# Patient Record
Sex: FEMALE | Race: BLACK OR AFRICAN AMERICAN | NOT HISPANIC OR LATINO | Employment: STUDENT | URBAN - METROPOLITAN AREA
[De-identification: names, ages, dates, MRNs, and addresses within clinical notes are randomized per-mention and may not be internally consistent; named-entity substitution may affect disease eponyms.]

---

## 2023-02-21 LAB
ALANINE AMINOTRANSFERASE (SGPT) (U/L) IN SER/PLAS: 9 U/L (ref 7–45)
ALBUMIN (G/DL) IN SER/PLAS: 4 G/DL (ref 3.4–5)
ALKALINE PHOSPHATASE (U/L) IN SER/PLAS: 94 U/L (ref 33–110)
ANION GAP IN SER/PLAS: 7 MMOL/L (ref 10–20)
ASPARTATE AMINOTRANSFERASE (SGOT) (U/L) IN SER/PLAS: 14 U/L (ref 9–39)
BILIRUBIN TOTAL (MG/DL) IN SER/PLAS: 0.3 MG/DL (ref 0–1.2)
CALCIUM (MG/DL) IN SER/PLAS: 8.9 MG/DL (ref 8.6–10.6)
CARBON DIOXIDE, TOTAL (MMOL/L) IN SER/PLAS: 31 MMOL/L (ref 21–32)
CHLORIDE (MMOL/L) IN SER/PLAS: 104 MMOL/L (ref 98–107)
CHOLESTEROL (MG/DL) IN SER/PLAS: 139 MG/DL (ref 0–199)
CHOLESTEROL IN HDL (MG/DL) IN SER/PLAS: 48.4 MG/DL
CHOLESTEROL/HDL RATIO: 2.9
CREATININE (MG/DL) IN SER/PLAS: 0.64 MG/DL (ref 0.5–1.05)
ESTIMATED AVERAGE GLUCOSE FOR HBA1C: 100 MG/DL
GFR FEMALE: >90 ML/MIN/1.73M2
GLUCOSE (MG/DL) IN SER/PLAS: 90 MG/DL (ref 74–99)
HEMOGLOBIN A1C/HEMOGLOBIN TOTAL IN BLOOD: 5.1 %
NON-HDL CHOLESTEROL: 91 MG/DL (ref 0–119)
POTASSIUM (MMOL/L) IN SER/PLAS: 4.4 MMOL/L (ref 3.5–5.3)
PROTEIN TOTAL: 7.3 G/DL (ref 6.4–8.2)
SODIUM (MMOL/L) IN SER/PLAS: 138 MMOL/L (ref 136–145)
UREA NITROGEN (MG/DL) IN SER/PLAS: 10 MG/DL (ref 6–23)

## 2023-09-25 PROBLEM — J45.30 ASTHMA, MILD PERSISTENT (HHS-HCC): Status: ACTIVE | Noted: 2023-09-25

## 2023-09-25 PROBLEM — E55.9 VITAMIN D DEFICIENCY: Status: ACTIVE | Noted: 2023-09-25

## 2023-09-25 PROBLEM — E66.9 OBESITY: Status: ACTIVE | Noted: 2023-09-25

## 2023-09-25 PROBLEM — L83 ACANTHOSIS NIGRICANS: Status: ACTIVE | Noted: 2023-09-25

## 2023-09-25 PROBLEM — R22.1 MASS OF LEFT SIDE OF NECK: Status: ACTIVE | Noted: 2023-09-25

## 2023-09-25 PROBLEM — R29.818 SUSPECTED SLEEP APNEA: Status: ACTIVE | Noted: 2023-09-25

## 2023-09-25 PROBLEM — R79.89 ELEVATED TESTOSTERONE LEVEL IN FEMALE: Status: ACTIVE | Noted: 2023-09-25

## 2023-09-25 PROBLEM — G40.309 GENERALIZED EPILEPSY (MULTI): Status: ACTIVE | Noted: 2023-09-25

## 2023-09-25 PROBLEM — N91.1 AMENORRHEA, SECONDARY: Status: ACTIVE | Noted: 2023-09-25

## 2023-09-25 PROBLEM — N92.6 IRREGULAR MENSES: Status: ACTIVE | Noted: 2023-09-25

## 2023-09-25 RX ORDER — NORGESTREL AND ETHINYL ESTRADIOL 0.3-0.03MG
1 KIT ORAL DAILY
COMMUNITY
Start: 2020-01-21 | End: 2023-10-03 | Stop reason: SDUPTHER

## 2023-09-25 RX ORDER — MOMETASONE FUROATE 200 UG/1
1 AEROSOL RESPIRATORY (INHALATION) DAILY
COMMUNITY
End: 2023-10-03 | Stop reason: SDDI

## 2023-09-25 RX ORDER — ALBUTEROL SULFATE 90 UG/1
2 AEROSOL, METERED RESPIRATORY (INHALATION)
COMMUNITY
Start: 2019-11-15 | End: 2023-10-03 | Stop reason: SDUPTHER

## 2023-09-25 RX ORDER — CLONAZEPAM 1 MG/1
1 TABLET, ORALLY DISINTEGRATING ORAL
COMMUNITY
Start: 2017-05-19 | End: 2023-10-03 | Stop reason: ALTCHOICE

## 2023-09-25 RX ORDER — COVID-19 ANTIGEN TEST
KIT MISCELLANEOUS
COMMUNITY
Start: 2022-10-14 | End: 2024-01-11 | Stop reason: WASHOUT

## 2023-09-25 RX ORDER — FOLIC ACID 0.4 MG
1 TABLET ORAL DAILY
COMMUNITY
Start: 2017-08-25 | End: 2024-01-11 | Stop reason: SDUPTHER

## 2023-09-25 RX ORDER — FOLIC ACID 1 MG/1
1 TABLET ORAL EVERY 24 HOURS
COMMUNITY
Start: 2021-04-07 | End: 2023-10-03 | Stop reason: ALTCHOICE

## 2023-09-25 RX ORDER — LEVETIRACETAM 500 MG/1
2 TABLET, EXTENDED RELEASE ORAL DAILY
COMMUNITY
Start: 2019-06-07 | End: 2023-12-19 | Stop reason: SDUPTHER

## 2023-09-25 RX ORDER — MULTIVITAMIN
1 TABLET ORAL DAILY
COMMUNITY
Start: 2020-10-01 | End: 2023-10-03 | Stop reason: SDUPTHER

## 2023-09-25 RX ORDER — MIDAZOLAM 5 MG/.1ML
SPRAY NASAL
COMMUNITY
Start: 2022-03-24

## 2023-09-25 RX ORDER — FLUTICASONE PROPIONATE 50 MCG
1 SPRAY, SUSPENSION (ML) NASAL DAILY
COMMUNITY
Start: 2015-01-23 | End: 2024-01-11 | Stop reason: SDUPTHER

## 2023-10-03 ENCOUNTER — OFFICE VISIT (OUTPATIENT)
Dept: PEDIATRICS | Facility: CLINIC | Age: 18
End: 2023-10-03
Payer: COMMERCIAL

## 2023-10-03 ENCOUNTER — LAB (OUTPATIENT)
Dept: LAB | Facility: LAB | Age: 18
End: 2023-10-03
Payer: COMMERCIAL

## 2023-10-03 VITALS
RESPIRATION RATE: 20 BRPM | HEART RATE: 108 BPM | WEIGHT: 293 LBS | DIASTOLIC BLOOD PRESSURE: 84 MMHG | HEIGHT: 66 IN | SYSTOLIC BLOOD PRESSURE: 128 MMHG | BODY MASS INDEX: 47.09 KG/M2 | TEMPERATURE: 96.3 F

## 2023-10-03 DIAGNOSIS — J30.9 ALLERGIC RHINITIS, UNSPECIFIED SEASONALITY, UNSPECIFIED TRIGGER: ICD-10-CM

## 2023-10-03 DIAGNOSIS — Z00.00 HEALTHCARE MAINTENANCE: ICD-10-CM

## 2023-10-03 DIAGNOSIS — E66.01 CLASS 3 SEVERE OBESITY DUE TO EXCESS CALORIES WITH BODY MASS INDEX (BMI) OF 50.0 TO 59.9 IN ADULT, UNSPECIFIED WHETHER SERIOUS COMORBIDITY PRESENT (MULTI): ICD-10-CM

## 2023-10-03 DIAGNOSIS — J45.20 MILD INTERMITTENT ASTHMA WITHOUT COMPLICATION (HHS-HCC): ICD-10-CM

## 2023-10-03 DIAGNOSIS — Z23 ENCOUNTER FOR IMMUNIZATION: ICD-10-CM

## 2023-10-03 DIAGNOSIS — Z30.41 ENCOUNTER FOR SURVEILLANCE OF CONTRACEPTIVE PILLS: Primary | ICD-10-CM

## 2023-10-03 LAB
25(OH)D3 SERPL-MCNC: 6 NG/ML (ref 30–100)
ALBUMIN SERPL BCP-MCNC: 4.2 G/DL (ref 3.4–5)
ALP SERPL-CCNC: 95 U/L (ref 33–110)
ALT SERPL W P-5'-P-CCNC: 13 U/L (ref 7–45)
ANION GAP SERPL CALC-SCNC: 15 MMOL/L (ref 10–20)
AST SERPL W P-5'-P-CCNC: 18 U/L (ref 9–39)
BILIRUB SERPL-MCNC: 0.4 MG/DL (ref 0–1.2)
BUN SERPL-MCNC: 8 MG/DL (ref 6–23)
CALCIUM SERPL-MCNC: 9 MG/DL (ref 8.6–10.6)
CHLORIDE SERPL-SCNC: 102 MMOL/L (ref 98–107)
CO2 SERPL-SCNC: 25 MMOL/L (ref 21–32)
CREAT SERPL-MCNC: 0.65 MG/DL (ref 0.5–1.05)
EST. AVERAGE GLUCOSE BLD GHB EST-MCNC: 111 MG/DL
GFR SERPL CREATININE-BSD FRML MDRD: >90 ML/MIN/1.73M*2
GLUCOSE SERPL-MCNC: 89 MG/DL (ref 74–99)
HBA1C MFR BLD: 5.5 %
POTASSIUM SERPL-SCNC: 4.3 MMOL/L (ref 3.5–5.3)
PROLACTIN SERPL-MCNC: 13.5 UG/L (ref 3–20)
PROT SERPL-MCNC: 8.2 G/DL (ref 6.4–8.2)
SODIUM SERPL-SCNC: 138 MMOL/L (ref 136–145)
TSH SERPL-ACNC: 2.4 MIU/L (ref 0.44–3.98)

## 2023-10-03 PROCEDURE — 83036 HEMOGLOBIN GLYCOSYLATED A1C: CPT

## 2023-10-03 PROCEDURE — 82306 VITAMIN D 25 HYDROXY: CPT

## 2023-10-03 PROCEDURE — 84146 ASSAY OF PROLACTIN: CPT

## 2023-10-03 PROCEDURE — 3008F BODY MASS INDEX DOCD: CPT | Performed by: PEDIATRICS

## 2023-10-03 PROCEDURE — 84443 ASSAY THYROID STIM HORMONE: CPT

## 2023-10-03 PROCEDURE — 90471 IMMUNIZATION ADMIN: CPT | Performed by: PEDIATRICS

## 2023-10-03 PROCEDURE — 80053 COMPREHEN METABOLIC PANEL: CPT

## 2023-10-03 PROCEDURE — 99214 OFFICE O/P EST MOD 30 MIN: CPT | Performed by: PEDIATRICS

## 2023-10-03 PROCEDURE — 36415 COLL VENOUS BLD VENIPUNCTURE: CPT

## 2023-10-03 PROCEDURE — 99214 OFFICE O/P EST MOD 30 MIN: CPT | Mod: GC | Performed by: PEDIATRICS

## 2023-10-03 RX ORDER — ALBUTEROL SULFATE 90 UG/1
2 AEROSOL, METERED RESPIRATORY (INHALATION) EVERY 4 HOURS PRN
Qty: 18 G | Refills: 1 | Status: SHIPPED | OUTPATIENT
Start: 2023-10-03 | End: 2023-12-04

## 2023-10-03 RX ORDER — CHOLECALCIFEROL (VITAMIN D3) 25 MCG
25 TABLET ORAL ONCE
Status: SHIPPED | OUTPATIENT
Start: 2023-10-03

## 2023-10-03 RX ORDER — MULTIVITAMIN
1 TABLET ORAL DAILY
Qty: 30 TABLET | Refills: 6 | Status: SHIPPED | OUTPATIENT
Start: 2023-10-03 | End: 2023-11-14

## 2023-10-03 RX ORDER — NORGESTREL AND ETHINYL ESTRADIOL 0.3-0.03MG
1 KIT ORAL DAILY
Qty: 28 TABLET | Refills: 1 | Status: SHIPPED | OUTPATIENT
Start: 2023-10-03 | End: 2023-11-14

## 2023-10-03 ASSESSMENT — PAIN SCALES - GENERAL: PAINLEVEL: 0-NO PAIN

## 2023-10-03 NOTE — PROGRESS NOTES
HPI:  Nick is here for birth control follow up. She does not have any concerns today. She doesn't miss any doesn't miss any of her OCP doses. Her last menstrual period cycle was September 15th. Her cycle lasted 7 days. She was having heavy bleeding. On the heaviest days, she was changing her pads every 4 hours. She has a daily dairy intake. She has had no headaches, fatigue, or pain. She does not have any breakthrough bleeding.    She has never been sexually active. No sexual abuse.    Needs refills of albuterol, MVI    10 point review of systems performed and negative except as noted in the HPI.    GENERAL: Well appearing, in no acute distress.  HEENT: No conjunctival injection, no scleral icterus, no conjunctival purulence or exudate. EOMI.  NECK: Supple, full voluntary range of motion  CHEST: Normal, age appropriate external chest  RESPIRATORY: Lungs clear to auscultation bilaterally. No wheezing, no crackles, no coarse breath sounds. Normal work of breathing, age-appropriate respiratory rate.  CARDIOVASCULAR: Regular, age-appropriate rate and rhythm. No extra heart sounds, no murmurs. Cap refill <2 seconds.  ABDOMEN: Soft, non-distended. No hepatosplenomegaly, no masses palpated. No tenderness to palpation in all quadrants.  GENITOURINARY: Normal external genitalia.  MUSCULOSKELETAL: Normal muscle bulk in all extremities. Normal voluntary range of motion. No joint or limb tenderness.  SKIN: No pathological rashes seen. Well perfused.  NEURO: Sensation and motor capacities grossly intact. Alert and awake with no altered level of consciousness.  PSYCH: General affect within normal limits.    Assessment/plan:  Nick is a 18 year old with obesity presenting for an OCP follow up visit. She has no concerns at this time and would like to continue the OCP. Regarding her obesity (gained 10 kg in 10 months), we will check labs today. She has agreed to stop drinking soda and cut back on fried foods. We will reassess in  2-3 months. Plan below:    #Contraception management  - OCP refilled  - Not sexually active ever and declined pregnancy test/STD testing    #Obesity  - Checking Hgb A1c, CMP, TSH, prolactin today    #Health maintenance:  - MVI refilled  - Prescribed Vitamin D    #Mild Intermittent Asthma, well controlled  - Refilled albuterol    #Epilepsy  - Recommended restarting folic acid supplementation as prescribed    Discussed with Dr. Dodd.    Joe Saunders MD  Pediatrics, PGY-3

## 2023-10-03 NOTE — PATIENT INSTRUCTIONS
It was a pleasure seeing you in clinic today! We sent refills for your albuterol, multivitamin, and birth control. We will see you back in 3 months for a follow up visit. Please cut out sodas and fried foods. Also, please exercise at least 150 minutes per week- 2.5 hours.

## 2023-10-04 DIAGNOSIS — E55.9 VITAMIN D DEFICIENCY: Primary | ICD-10-CM

## 2023-10-04 RX ORDER — ERGOCALCIFEROL 1.25 MG/1
1.25 CAPSULE ORAL
Qty: 8 CAPSULE | Refills: 0 | Status: SHIPPED | OUTPATIENT
Start: 2023-10-04 | End: 2023-10-05 | Stop reason: SDUPTHER

## 2023-10-05 DIAGNOSIS — E55.9 VITAMIN D DEFICIENCY: ICD-10-CM

## 2023-10-05 RX ORDER — ERGOCALCIFEROL 1.25 MG/1
1.25 CAPSULE ORAL
Qty: 8 CAPSULE | Refills: 0 | Status: SHIPPED | OUTPATIENT
Start: 2023-10-05 | End: 2023-10-09 | Stop reason: SDUPTHER

## 2023-10-09 ENCOUNTER — TELEPHONE (OUTPATIENT)
Dept: PEDIATRICS | Facility: CLINIC | Age: 18
End: 2023-10-09
Payer: COMMERCIAL

## 2023-10-09 DIAGNOSIS — E55.9 VITAMIN D DEFICIENCY: ICD-10-CM

## 2023-10-09 NOTE — TELEPHONE ENCOUNTER
Copied from CRM #[id number]. Topic: Information Request - Prescription Refill FAQ  >> Oct 9, 2023  2:32 PM Phillip DE LA ROSA wrote:  Patient is requesting a call back regarding RX being sent to the incorrect pharmacy.      Storm requesting meds be sent to another pharmacy.  Changed in the chart and resent.

## 2023-10-10 RX ORDER — ERGOCALCIFEROL 1.25 MG/1
1.25 CAPSULE ORAL
Qty: 8 CAPSULE | Refills: 0 | Status: SHIPPED | OUTPATIENT
Start: 2023-10-10 | End: 2024-01-11 | Stop reason: SDUPTHER

## 2023-11-07 ENCOUNTER — TELEPHONE (OUTPATIENT)
Dept: PEDIATRIC NEUROLOGY | Facility: HOSPITAL | Age: 18
End: 2023-11-07
Payer: COMMERCIAL

## 2023-11-07 NOTE — TELEPHONE ENCOUNTER
Message left on the Pediatric Epilepsy VMX - unable to understand but the message was probably left in error, as Dr. Coleman did not see this patient before.

## 2023-12-19 DIAGNOSIS — G40.309 GENERALIZED EPILEPSY (MULTI): Primary | ICD-10-CM

## 2023-12-19 RX ORDER — LEVETIRACETAM 500 MG/1
1000 TABLET, EXTENDED RELEASE ORAL DAILY
Qty: 30 TABLET | Refills: 0 | Status: SHIPPED | OUTPATIENT
Start: 2023-12-19 | End: 2024-01-19

## 2023-12-26 RX ORDER — NORGESTREL AND ETHINYL ESTRADIOL 0.3-0.03MG
1 KIT ORAL DAILY
COMMUNITY

## 2023-12-26 RX ORDER — CLONAZEPAM 1 MG/1
1 TABLET, ORALLY DISINTEGRATING ORAL DAILY PRN
COMMUNITY
End: 2024-01-26 | Stop reason: CLARIF

## 2024-01-11 ENCOUNTER — LAB (OUTPATIENT)
Dept: LAB | Facility: LAB | Age: 19
End: 2024-01-11
Payer: COMMERCIAL

## 2024-01-11 ENCOUNTER — OFFICE VISIT (OUTPATIENT)
Dept: PEDIATRICS | Facility: CLINIC | Age: 19
End: 2024-01-11
Payer: COMMERCIAL

## 2024-01-11 VITALS
SYSTOLIC BLOOD PRESSURE: 119 MMHG | BODY MASS INDEX: 47.09 KG/M2 | HEIGHT: 66 IN | HEART RATE: 76 BPM | WEIGHT: 293 LBS | TEMPERATURE: 97.3 F | RESPIRATION RATE: 20 BRPM | DIASTOLIC BLOOD PRESSURE: 78 MMHG

## 2024-01-11 DIAGNOSIS — T78.40XD ALLERGY, SUBSEQUENT ENCOUNTER: ICD-10-CM

## 2024-01-11 DIAGNOSIS — E66.01 SEVERE OBESITY DUE TO EXCESS CALORIES WITHOUT SERIOUS COMORBIDITY WITH BODY MASS INDEX (BMI) GREATER THAN 99TH PERCENTILE FOR AGE IN PEDIATRIC PATIENT (MULTI): ICD-10-CM

## 2024-01-11 DIAGNOSIS — R56.9 SEIZURE (MULTI): ICD-10-CM

## 2024-01-11 DIAGNOSIS — E55.9 VITAMIN D DEFICIENCY: ICD-10-CM

## 2024-01-11 DIAGNOSIS — J45.20 MILD INTERMITTENT ASTHMA WITHOUT COMPLICATION (HHS-HCC): ICD-10-CM

## 2024-01-11 DIAGNOSIS — Z30.41 ENCOUNTER FOR SURVEILLANCE OF CONTRACEPTIVE PILLS: ICD-10-CM

## 2024-01-11 DIAGNOSIS — R79.89 ELEVATED TESTOSTERONE LEVEL IN FEMALE: ICD-10-CM

## 2024-01-11 DIAGNOSIS — Z00.01 ENCOUNTER FOR GENERAL ADULT MEDICAL EXAMINATION WITH ABNORMAL FINDINGS: Primary | ICD-10-CM

## 2024-01-11 DIAGNOSIS — R05.8 POST-VIRAL COUGH SYNDROME: ICD-10-CM

## 2024-01-11 PROBLEM — R22.1 MASS OF LEFT SIDE OF NECK: Status: RESOLVED | Noted: 2023-09-25 | Resolved: 2024-01-11

## 2024-01-11 PROBLEM — N92.6 IRREGULAR MENSES: Status: RESOLVED | Noted: 2023-09-25 | Resolved: 2024-01-11

## 2024-01-11 LAB
ALBUMIN SERPL BCP-MCNC: 4 G/DL (ref 3.4–5)
ALP SERPL-CCNC: 83 U/L (ref 33–110)
ALT SERPL W P-5'-P-CCNC: 14 U/L (ref 7–45)
ANION GAP SERPL CALC-SCNC: 13 MMOL/L (ref 10–20)
AST SERPL W P-5'-P-CCNC: 16 U/L (ref 9–39)
BILIRUB SERPL-MCNC: 0.3 MG/DL (ref 0–1.2)
BUN SERPL-MCNC: 4 MG/DL (ref 6–23)
CALCIUM SERPL-MCNC: 9 MG/DL (ref 8.6–10.6)
CHLORIDE SERPL-SCNC: 100 MMOL/L (ref 98–107)
CHOLEST SERPL-MCNC: 169 MG/DL (ref 0–199)
CHOLESTEROL/HDL RATIO: 3.3
CO2 SERPL-SCNC: 29 MMOL/L (ref 21–32)
CREAT SERPL-MCNC: 0.54 MG/DL (ref 0.5–1.05)
EGFRCR SERPLBLD CKD-EPI 2021: >90 ML/MIN/1.73M*2
EST. AVERAGE GLUCOSE BLD GHB EST-MCNC: 108 MG/DL
GLUCOSE SERPL-MCNC: 78 MG/DL (ref 74–99)
HBA1C MFR BLD: 5.4 %
HDLC SERPL-MCNC: 50.5 MG/DL
INSULIN SERPL-ACNC: 20 UIU/ML (ref 3–25)
LDLC SERPL CALC-MCNC: 105 MG/DL
NON HDL CHOLESTEROL: 119 MG/DL (ref 0–119)
POTASSIUM SERPL-SCNC: 4.6 MMOL/L (ref 3.5–5.3)
PROT SERPL-MCNC: 8.2 G/DL (ref 6.4–8.2)
SODIUM SERPL-SCNC: 137 MMOL/L (ref 136–145)
TRIGL SERPL-MCNC: 68 MG/DL (ref 0–149)
VLDL: 14 MG/DL (ref 0–40)

## 2024-01-11 PROCEDURE — 99395 PREV VISIT EST AGE 18-39: CPT | Mod: GC | Performed by: PEDIATRICS

## 2024-01-11 PROCEDURE — 3008F BODY MASS INDEX DOCD: CPT | Performed by: PEDIATRICS

## 2024-01-11 PROCEDURE — 36415 COLL VENOUS BLD VENIPUNCTURE: CPT

## 2024-01-11 PROCEDURE — 99214 OFFICE O/P EST MOD 30 MIN: CPT | Performed by: PEDIATRICS

## 2024-01-11 PROCEDURE — 83525 ASSAY OF INSULIN: CPT

## 2024-01-11 PROCEDURE — 99394 PREV VISIT EST AGE 12-17: CPT | Performed by: PEDIATRICS

## 2024-01-11 PROCEDURE — 83036 HEMOGLOBIN GLYCOSYLATED A1C: CPT

## 2024-01-11 PROCEDURE — 1036F TOBACCO NON-USER: CPT | Performed by: PEDIATRICS

## 2024-01-11 PROCEDURE — 80053 COMPREHEN METABOLIC PANEL: CPT

## 2024-01-11 PROCEDURE — 99395 PREV VISIT EST AGE 18-39: CPT | Performed by: PEDIATRICS

## 2024-01-11 PROCEDURE — 80061 LIPID PANEL: CPT

## 2024-01-11 RX ORDER — FOLIC ACID 0.4 MG
0.4 TABLET ORAL DAILY
Qty: 1 TABLET | Refills: 1 | Status: SHIPPED | OUTPATIENT
Start: 2024-01-11 | End: 2024-02-19 | Stop reason: SDUPTHER

## 2024-01-11 RX ORDER — MULTIVITAMIN
1 TABLET ORAL DAILY
Qty: 30 TABLET | Refills: 3 | Status: SHIPPED | OUTPATIENT
Start: 2024-01-11

## 2024-01-11 RX ORDER — NORGESTREL AND ETHINYL ESTRADIOL 0.3-0.03MG
1 KIT ORAL DAILY
Qty: 28 TABLET | Refills: 0 | Status: SHIPPED | OUTPATIENT
Start: 2024-01-11

## 2024-01-11 RX ORDER — FLUTICASONE PROPIONATE 50 MCG
1 SPRAY, SUSPENSION (ML) NASAL DAILY
Qty: 16 G | Refills: 1 | Status: SHIPPED | OUTPATIENT
Start: 2024-01-11

## 2024-01-11 RX ORDER — ALBUTEROL SULFATE 90 UG/1
2 AEROSOL, METERED RESPIRATORY (INHALATION) EVERY 4 HOURS PRN
Qty: 18 G | Refills: 1 | Status: SHIPPED | OUTPATIENT
Start: 2024-01-11 | End: 2024-03-04 | Stop reason: SDUPTHER

## 2024-01-11 RX ORDER — FERROUS SULFATE, DRIED 160(50) MG
1 TABLET, EXTENDED RELEASE ORAL DAILY
Qty: 30 TABLET | Refills: 3 | Status: SHIPPED | OUTPATIENT
Start: 2024-01-11 | End: 2024-04-09

## 2024-01-11 RX ORDER — ERGOCALCIFEROL 1.25 MG/1
1.25 CAPSULE ORAL
Qty: 8 CAPSULE | Refills: 0 | Status: SHIPPED | OUTPATIENT
Start: 2024-01-11 | End: 2024-01-11

## 2024-01-11 ASSESSMENT — PAIN SCALES - GENERAL: PAINLEVEL: 3

## 2024-01-11 NOTE — PROGRESS NOTES
Subjective   - Here today for wellness visit, presenting by herself   - URI: Onset of symptoms 01/02 with pain with swallowing and dizziness, 1 week later onset of cough. Presented to [facility] ED 01/04 at which time COVID flu and strep rapid/Cx negative, advised supportive care. Presented to urgent care 01/06 at which time reports Rx Amoxicillin, unsure what diagnosis. Overall improving, still with cough, no wheezing   - Mold in home, acute stressor, she and mother are looking into moving     HISTORY  - PMHx: Generalized epilepsy on Keppra, previously followed at [facility] and now transitioned to adult Neurology but has not had appt, last seizure 2017. Asthma. Allergies. Suspected NEEMA   - PSx: Tonsillectomy (05/19/2017).   - Hosp: None  - Med: Keppra 1000 mg daily managed by Neurology no missed doses. OCP daily, no missed doses. Albuterol PRN. Folic acid (unsure why).   - All: is allergic to banana and watermelon.  - Immunization: UTD including COVID and flu   - FamHx:family history includes Asthma in her brother, mother, mother's brother, and another family member; Birth defects in an other family member; Diabetes in her father; Hypertension in her maternal grandfather and maternal great-grandfather; Seizures in her brother and mother's brother; antiphospholipid syndrome in her mother.   - PCP: Mary Dodd MD     HEALTH MAINTENANCE/SOCIAL  - Home: Mother, half brother, and dog. Speaks with father occasionally.   - Eating: Endorses over-eating, has previously spoken with Nutrition,  has not made changes yet    - Education: Graduated high school in 2022, since then not in school and does not work. Reports spends day watching TV and playing with dog   - Employment: None  - Drugs: No/never alcohol, marijuana, or other drug use   - Sleep:  - Sexuality: Identifies as female, not sure if interested in boys or girls, never sexually active   - Suicide/Depression: [facility] weekly counseling   - Menstruation: LMP last  "month      Objective   Visit Vitals  /78   Pulse 76   Temp 36.3 °C (97.3 °F)   Resp 20   Ht 1.677 m (5' 6.02\")   Wt 146 kg (321 lb 6.9 oz)   BMI 51.84 kg/m²   OB Status Having periods   Smoking Status Never   BSA 2.61 m²      BP percentile: Growth %ile SmartLinks can only be used for patients less than 20 years old.  Height percentile: Facility age limit for growth %blas is 20 years.  Weight percentile: Facility age limit for growth %blas is 20 years.  BMI percentile: Facility age limit for growth %blas is 20 years.     Physical exam:  - Gen: Alert and well appearing. In no acute distress.   - Head/Neck: No deformities or trauma. Neck supple with normal ROM. No cervical lymphadenopathy. Acanthosis over neck   - Eyes: EOMI. PERRL. Anicteric sclera. Noninjected conjunctivae  - Mouth: MMM. No lesions or erythema  - Heart: RRR. No murmurs, rubs, or gallops appreciated. Cap refill <2 sec  - Lungs: CTAB with equal air entry. No rhonchi, rales, or wheezes. No increased WOB. Coughing frequently, dry cough   - Abdomen: Soft, non tender and nondistended with bowel sounds throughout. No hepatosplenomegaly. No masses  - MSK: No joint swelling, warmth, or redness. Moves all extremities equally. Normal muscle bulk  - Skin: No pathological rashes or lesions   - Neuro:  Awake, alert, answering questions/interacting appropriately, no gross deficits noted. Normal tone     SCREENS  -PHQ9: 9, 5-9: mild depression. [facility] Score of 2 for over eating and feeling tired. Reports acute stressor of mold in home. No/never suicidal ideation or attempt      Assessment/Plan   Nick Whiteside is a 18 y.o. female presenting for well check     1. Severe obesity with BMI >99th%ile    2. Elevated testosterone level  - Labs today (fasting): Lipid Panel, Hg A1C, CMP, insulin  - Nutrition consult   - Today provided list of obesity management meds including topiramate and phentermine and GLP 1 agonists. She will review " meds at home with mother and is willing to discuss in future.     3. Mild intermittent asthma  - Refilled albuterol 90 mcg/actuation inhaler; Inhale 2 puffs every 4 hours if needed for wheezing.      4. Post-viral cough syndrome  - No concern for bacterial infection thus Abx not warranted. Flu/COVID/strep rapid and culture negative at [facility] ED. Though no wheezing for next 2 days recommend scheduled albuterol. As tolerating PO and no bacterial focus of infection on exam, further work up not warranted, cleared for discharge home.    5. Surveillance of contraceptive pills for amenorrhea   - Refilled norgestrel-ethinyl estradioL ([place], 28,) 0.3-30 mg-mcg tablet; Take 1 tablet by mouth once daily.      6. Vitamin D deficiency  - Lab today:  Vitamin D 25-Hydroxy,Total (for eval of Vitamin D levels)  - Refilled ergocalciferol (Vitamin D2) 1.25 MG (83712 UT) capsule; Take 1 capsule (1,250 mcg) by mouth 1 (one) time per week.  Di    7. Allergy, subsequent encounter  - Refilled fluticasone (Flonase) 50 mcg/actuation nasal spray; Administer 1 spray into each nostril once daily.      8. Health maintenance  - UTD on immunizations including flu and COVID     9. Mold in home  - Given number for Althea Hernandez, offered social work consult in clinic today but patient declined     10. Concern for mild depression with PHQ score of 9  - Continue [facility] therapy, reports that symptoms are related to acute stressor of mold in home, please see above    Follow up in 1 month to discuss obesity medications, labs to be obtained today as above    Eloisa Potts MD     Staffed with attending physician Dr. Dodd

## 2024-01-11 NOTE — PATIENT INSTRUCTIONS
FOR MOLD ISSUES help, call our [facility] office: [phone]    Please go to the lab and get your labs   - Lipid Panel  - Vitamin D 25-Hydroxy,Total (for eval of Vitamin D levels)  - Hemoglobin A1C  - Comprehensive Metabolic Panel  - Insulin, Random    Topiramate and Phentermine   What are these medicines used for?    Topiramate helps patients feel less hungry and feel full more quickly. It may also help patients decrease binge eating behaviors.     Phentermine is thought to work in the brain to decrease appetite.      Both medications are used in people who carry extra weight AND who are enrolled in a weight loss program that includes dietary, physical activity, and behavior changes.       How do they work?    Topiramate was first developed to treat seizures in children and migraine headaches in adults. It affects chemical messengers in the brain, but the exact way it works to change appetite is unknown.   Phentermine is thought to work in the brain to decrease appetite.      Topiramate and phentermine may help you:    Feel less interest in eating in between meals    Think less about food and eating    Feel full or satisfied sooner    Have an easier time eating less      Topiramate extended release in combination with phentermine has been FDA approved for weight loss in patients 12 and older (marketed as Qsymia)    For some patients, these medications work right away. They feel and think quite differently about food. Other patients don't feel much of a change but find that they lose weight. Finally, some patients do not have these feelings and do not have improvements in weight. For these patients, medications may be stopped after 3 months.       Like all weight loss medications, these medications work best when you help it work. This means:    Drinking water instead of sugar sweetened drinks (e.g. regular soda, juice)   Removing tempting high calorie (“junk”) food from the house    Staying away from  situations or people that trigger your food cravings    Eating your meals at a table with all screens off (TV, phone)   Keeping track of what you are eating and drinking     How should I take these medications?    Topiramate   Week 1: One tablet (25 mg) once a day   Week 2: Two tablets (50 mg) once a day   Week 3: Three tablets (75 mg) once a day    Week 4: Four tablets (100mg) once a day. Stay at four tablets (100 mg) until you are seen again.      NEVER stop topiramate suddenly. Your medical provider will tell you how to slowly come off this medicine if needed.  NEVER take topiramate with alcohol     Phentermine   Phentermine is usually taken as a single daily dose in the morning with or without food.    Do not take a larger dose, take it more often, or take it for a longer period than your doctor tells you to. Do not drink alcohol while on phentermine.      Are these medications safe?    Topiramate   Although topiramate alone is not approved by the FDA for weight loss, it is used commonly in weight management clinics because of its effects on appetite.  It is also approved for children as young as 2 years old for other reasons such as seizures and migraines.     Most people tolerate topiramate with no problems. Please tell your medical provider if you have a history of kidney stones, if you are taking phenytoin (brand name: Dilantin) or birth control pills, or if you are pregnant. Topiramate is harmful in pregnancy. Topiramate can decrease your ability to tolerate hot weather. You should be sure to drink plenty of water to prevent dehydration and kidney stones. Your medical provider will also check for possible interactions between your usual medications and topiramate.      One of the dangers of topiramate is the possibility of birth defects. If you get pregnant when you are taking topiramate, there is the risk that your baby will be born with a cleft lip or palate. If you are on topiramate and are of child  bearing age, you must be on a reliable form of birth control or refrain from sex. Birth control pills may not work as effectively while taking topiramate.     Phentermine   Phentermine is not FDA approved for use in children or adolescents under 16 years of age by itself. You should not take phentermine if you have high blood pressure, heart disease, hyperthyroidism (overactive thyroid gland), glaucoma, if you are taking stimulant ADHD medications, if you have struggled with drug abuse, or if you are pregnant. Your medical provider will also check for possible interactions between your usual medications and phentermine.     What are the side effects?    Call your doctor right away if you notice any of the starred side effects:      Topiramate   Change in mood, especially depression or thoughts of suicide*     Severe pain in your sides, back, or groin (which may indicate a kidney stone)*   Sudden change in vision or eye pain*   New severe rash*     Phentermine   Chest pain*   Shortness of breath*    Difficulty doing exercises that you had been able to do before*    Swelling of the legs or ankles*    Severe restlessness, anxiety, or dizziness*    Increased blood pressure or heart rate       If you notice these less serious side effects, talk with your medical provider:     Topiramate   Mental fogginess, trouble concentrating, memory problems   Numbness or tingling in your hands or feet   Fatigue   New overheating   Nausea, vomiting, diarrhea or constipation     Phentermine   Trouble sleeping    Diarrhea or constipation    Dry mouth      How much does it cost?    Topiramate: $5 per month   Phentermine: $20-30/month. Currently, phentermine is not covered by insurance      If the cost is significantly more than this when you  either medication, please call us.      (Developed by: [facility], [place]) Lifestyle Medicine Program & [facility]- v.[id number])     GLP-1 Receptor Agonists (examples: liraglutide, semaglutide)     What are these medicines used for?    These medications were first developed to treat diabetes but have also been shown to have a significant effect of weight loss.      How do they work?    They work by affecting a hormone in your body that leads to decreased appetite, decreased food intake, and decreased rate that the stomach empties into the small intestine.       These medications may help you:   Feel less hungry   Lower food cravings   Increase your feeling of control around eating habits       Like all weight loss medications, these medications work best in combination with healthy nutrition, physical activity, and sleep.        How should I take these medications?    These medications are given by a small injection under the skin (“subcutaneous”) either once a day or once a week. The usual dosing is listed below, but please follow your medical provider's instructions for your specific plan.        Liraglutide (brand name: Saxenda), subcutaneous, daily    Week 1: 0.6mg every day   Week 2: 1.2mg every day   Week 3: 1.8mg every day   Week 4: 2.4mg every day   Week 5 and beyond: 3.0mg every day or maximum tolerated dose     Note: Daily dose may be split between pens. Please discuss this with your medical team or healthcare provider     Please go to the [employer] for instructions and a video regarding the technique to give yourself injections:   [email or web address].com     Semaglutude (brand name: Wegovy), subcutaneous, weekly    Week 1-4: 0.25mg once weekly   Week 5-8: 0.5mg once weekly   Week 9-12: 1mg once weekly   Week 13-16: 1.7mg once weekly   Week 17 and beyond: 2.4mg once weekly or maximum tolerated dose     Please go to the [employer] for instructions and a video regarding the technique to give yourself injections:   [email or web address].[email or web address]     Semaglutude (brand name: Ozempic), subcutaneous, weekly    Week 1-4: 0.25mg once weekly   Week  5-8: 0.5mg once weekly   Week 9-12: 1mg once weekly   Week 13-16: 2mg once weekly     Please go to the Ozempic for instructions and a video regarding the technique to give yourself injections:   [email or web address]     What if I miss a dose?   Liraglutide   If a dose is missed, restart the next day. An extra dose or increase in dose should not be taken to make up for the missed dose.    If more than 3 days have passed since the last dose, please contact your health care provider about how to restart the medication      Semaglutide:    If you miss a dose, and your next dose is more than 2 days (48 hours) away, take the missed dose when you remember   If you miss a dose, and the next scheduled dose is less than 2 days away (48 hours), do not give the dose. Take your next dose on the regularly scheduled day.   If you miss 2 or more doses, take the next dose on the regularly scheduled day or call your health care provider to talk about how to restart due to possible side effects     Storage   Liraglutide   Store new, unused Saxenda®?pens in the refrigerator between 36°F and 46°F (2°C to 8°C).    After first use, store in a refrigerator or at room temperature between 59°F and 86°F (15°C to 30°C).    Pens in use should be thrown away after 30 days even if they still have Saxenda®?left in them.    Don't freeze Saxenda®. Saxenda®?that has been frozen must not be used.       Semaglutide:    Store the WEGOVY single-dose pen in the refrigerator from 36°F to 46°F (2°C to 8°C).    If needed, prior to taking off the cap, the pen can be kept from 46°F to 86°F (8°C to 30°C) up to 28 days.    Do not freeze.      Protect WEGOVY from light. WEGOVY must be kept in the original carton until its time to inject.    Discard the WEGOVYpen after use.     Ozempic:   Store your new, unused Ozempic®?pens?in the refrigerator between 36°F to 46°F (2°C to 8°C).    Store your pen in use for 56 days at room temperature between 59ºF to 86ºF (15ºC to  30ºC) or in a refrigerator between 36°F to 46°F (2°C to 8°C).    Discard after 56 days.     Are these medications safe?    For weight loss, both liraglutide and semaglutide are FDA approved for age 12 years and older. This class of medication is also approved for people who have diabetes. As with any medication, there may be side effects. More serious things that can happen include allergic reactions, thyroid tumors, pancreatitis, gallbladder problems, kidney problems, and worsened depression.       You should not take these medications if you think you may be pregnant or are planning to become pregnant. You should not take these medications if you or a family member has medullary thyroid carcinoma or if you have multiple endocrine neoplasia type 2.       What are the possible side effects?    If you notice these common, but less serious side effects, talk with your medical provider:   Abdominal pain, nausea, vomiting, heartburn, diarrhea, constipation   Headache   Tiredness   Dizziness    Reaction at the injection site   Low blood sugar (if taking this medication with certain diabetes medications)   Increased heart rate       Call your doctor right away if you notice any of the following less common side effects:    Lump or swelling in your neck, hoarseness, trouble swallowing or shortness of breath (could be related to a new thyroid problem)   Severe abdominal pain, especially if it travels to the back (possible signs of pancreatitis)   Abdominal pain (especially in your upper stomach) with fever, yellowing of the skin or eyes or abel-colored stools (possible gallbladder problem)   If you develop rash, facial swelling, and/or trouble breathing (allergic reaction), call your medical provider or if severe, call 911     How much does it cost?    The out of pocket cost varies by insurance, but if you are asked to pay >$50 per month, please call us before filling the prescription. You can visit the following websites  to see if you qualify for a medication savings card.     Wegovy: https://www.[employer]/wegovy/savings-card.html   Saxenda: https://www.[employer]/   Ozempic: https://www.[employer]/ozempic/savings-card.html?rut=020301421       (Developed by: [facility] Lifestyle Medicine Program)

## 2024-01-18 DIAGNOSIS — G40.309 GENERALIZED EPILEPSY (MULTI): ICD-10-CM

## 2024-01-19 RX ORDER — LEVETIRACETAM 500 MG/1
1000 TABLET, EXTENDED RELEASE ORAL DAILY
Qty: 30 TABLET | Refills: 0 | Status: SHIPPED | OUTPATIENT
Start: 2024-01-19 | End: 2024-01-26 | Stop reason: SDUPTHER

## 2024-01-26 ENCOUNTER — OFFICE VISIT (OUTPATIENT)
Dept: PEDIATRIC NEUROLOGY | Facility: CLINIC | Age: 19
End: 2024-01-26
Payer: COMMERCIAL

## 2024-01-26 VITALS
HEIGHT: 66 IN | WEIGHT: 293 LBS | DIASTOLIC BLOOD PRESSURE: 80 MMHG | RESPIRATION RATE: 20 BRPM | BODY MASS INDEX: 47.09 KG/M2 | SYSTOLIC BLOOD PRESSURE: 121 MMHG | TEMPERATURE: 98 F | HEART RATE: 92 BPM

## 2024-01-26 DIAGNOSIS — G40.309 GENERALIZED EPILEPSY (MULTI): Primary | ICD-10-CM

## 2024-01-26 PROCEDURE — 3008F BODY MASS INDEX DOCD: CPT | Performed by: PSYCHIATRY & NEUROLOGY

## 2024-01-26 PROCEDURE — 99214 OFFICE O/P EST MOD 30 MIN: CPT | Performed by: PSYCHIATRY & NEUROLOGY

## 2024-01-26 PROCEDURE — 1036F TOBACCO NON-USER: CPT | Performed by: PSYCHIATRY & NEUROLOGY

## 2024-01-26 RX ORDER — LEVETIRACETAM 500 MG/1
1000 TABLET, EXTENDED RELEASE ORAL DAILY
Qty: 60 TABLET | Refills: 8 | Status: SHIPPED | OUTPATIENT
Start: 2024-01-26 | End: 2024-02-25

## 2024-01-26 RX ORDER — DIAZEPAM 10 MG/100UL
1 SPRAY NASAL ONCE
Qty: 1 SPRAY | Refills: 1 | Status: SHIPPED | OUTPATIENT
Start: 2024-01-26 | End: 2024-01-26

## 2024-01-26 NOTE — PROGRESS NOTES
Patient Discussion/Summary     Continue Extended release 1000 mg a day     Discussed Nayzilam administration for seizure longer than 5 minutes to replace clonazepam disintegrating tablet     Continue folic acid 4 mcg qd     Follow-up in April 2023     Please call my office if you have a seizure without skipping Keppra     Discussed seizure precaution      Provider Impressions  Dr. 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 Past EEG showed generalized spike and wave discharges     Seizure Semiology: 1) Left Version --> generalized tonic clonic seizures      Last seizure in May 2017-when she weaned off the Keppra     Admits complete adherence to Keppra.     NO seizure. DOing well on LEV ER 1000 mg QHS. Want to see if we can wean KEppra next year-per patient. Follow up in September-VIRTUALLY*     She is not driving now, graduated from school.     ------------------------------------------------------------------------------------------------------------------------------------------  CONTROLLED SUBSTANCE-DOCUMENTATION    I have personally reviewed the OARRS report. This report is scanned into the electronic medical record. I have considered the risks of abuse, dependence, addiction and diversion. I believe that it is clinically appropriate  to be prescribed this medication.  Also, I believe that it is clinically appropriate for this patient to be prescribed this medication. Based on the patient's condition and response to current treatment regimen, I do not feel that it is clinically necessary for this patient to be seen in the office every 90 days.     (diazepam, clonazepam, IN midazolam)  What is the patient's goal of therapy?  Seizure rescue medicine  Is this being achieved with current treatment?  Yes    (clobazam, lacosamide, perampanel, Epidiolex, briviacetam)  What is the patient's goal of therapy?  Seizure treatment  Is this  "being achieved with current treatment?  Yes    UDS is not clinically indicated.  Assessed for risk of addiction, abuse and/or diversion using \"red flags.\"  Patient was counseled on the abuse potential. Patient was educated on the risk and effect of combining multiple controlled substances. Patient voiced understanding of the risks of combination of opioids and benzodiazepines,  and I discussed alternative treatment options when applicable.    Patient was reminded that it is both unsafe and unlawful to give away or sell controlled substance.  Discussed proper and secure storage and disposal of unused medications.        Chief Complaint     follow up sz   Accompanied by self.      History of Present Illness     The patient is Dr. Darcie Clemente's patient. Patient is being transferred to my care. Below note is from last neurology clinic note.     Nick is 15 year old right-handed girl with epilepsy, now on Keppra without recurrence thus far and tolerating the medication well with ongoing seizure freedom. We discussed updating her video EEG and Keppra level. If EEG normal, we discussed the option to wean and holding off on driving until 6 months seizure free of medications. If EEG remains abnormal and she remains complaint with medications we discussed completing her driving paperwork and continuing Keppra     In the interim since her last appointment, Nick had tried to wean off her medication and then had a breakthrough seizure after discontinuing Keppra. She seemed off on the morning of the seizure, was staring into space and she dozed off and then had generalized tonic clonic seizure, foaming at the mouth, called 911. Back on her medication  since the seizure on May 2, 2021. No side effects.      Seizure History:  Seizure Semiology: 1) Left Version --> generalized tonic clonic seizures  Seizure History: 2 prior history of seizures, first occurred on [place] and consisted of shaking of bilateral upper " extremities. The second event occurred on May 19 and consisted of left head and eye deviation with snoring and gurgling sounds. She was started on Keppra following the second seizure and has not had recurrence.     Last Seizure: 2021     Routine EE/2017: Normal  2017: Normal  Video EEG:   2020: Normal     2019: video EEG for 24 hours. Seizure precautions maintained and neurological checks performed every 4 hours while awake. This video EEG is abnormal based on the presence of generalized polyspikes, seen 1-2 times per 4 hours in early stages of sleep. This finding is suggest of potential generalized epileptogenicity. No seizures recorded. No staring spell captured.      Neuroimaging: MRI 2017: Unremarkable Examination  Current AEDs: Keppra XR 1000mg Daily  Past AEDs: None  Rescue Medication: Nayzilam  ------------------------  As of 3/24/2022  She admits complete adherence to Keppra  Last seizure in May 2021 when she stopped Keppra  She does not drive now     24 hr VEEG The vEEG was normal. There were no seizures, epileptiform discharges or lateralizing signs seen. Keppra level was subtherapeutic 4.Given that she has not any seizures on a subtherapeutic dose of Keppra, the decision was made to lower her Keppra ER dose to 750mg QD.   _______________________________________________  As of 24   Now taking Keppra XR 1000 mg every day  Not driving    REVIEW OF SYSTEMS:A complete review of systems was performed and was negative for complaint with the exception of that noted above.    EXAM  Neurological exam remains unchanged since his last visit as dated above.    Optho: Not examined  CV: Normal S1-S2, regular rhythm and rate, no murmur  Lungs: Clear to auscultation bilaterally  Abdomen: Soft, NT/ND    Additional findings: overweight

## 2024-02-16 DIAGNOSIS — R56.9 SEIZURE (MULTI): ICD-10-CM

## 2024-02-16 DIAGNOSIS — J45.20 MILD INTERMITTENT ASTHMA WITHOUT COMPLICATION (HHS-HCC): ICD-10-CM

## 2024-02-19 RX ORDER — FOLIC ACID 0.4 MG
0.4 TABLET ORAL DAILY
Qty: 30 TABLET | Refills: 8 | Status: SHIPPED | OUTPATIENT
Start: 2024-02-19

## 2024-03-04 RX ORDER — ALBUTEROL SULFATE 90 UG/1
2 AEROSOL, METERED RESPIRATORY (INHALATION) EVERY 4 HOURS PRN
Qty: 18 G | Refills: 1 | Status: SHIPPED | OUTPATIENT
Start: 2024-03-04

## 2024-03-06 ENCOUNTER — OFFICE VISIT (OUTPATIENT)
Dept: PEDIATRICS | Facility: CLINIC | Age: 19
End: 2024-03-06
Payer: COMMERCIAL

## 2024-03-06 VITALS
WEIGHT: 293 LBS | BODY MASS INDEX: 54.39 KG/M2 | OXYGEN SATURATION: 98 % | TEMPERATURE: 97.6 F | SYSTOLIC BLOOD PRESSURE: 122 MMHG | HEART RATE: 99 BPM | DIASTOLIC BLOOD PRESSURE: 78 MMHG | RESPIRATION RATE: 20 BRPM

## 2024-03-06 DIAGNOSIS — R05.1 ACUTE COUGH: Primary | ICD-10-CM

## 2024-03-06 PROCEDURE — 1036F TOBACCO NON-USER: CPT | Performed by: STUDENT IN AN ORGANIZED HEALTH CARE EDUCATION/TRAINING PROGRAM

## 2024-03-06 PROCEDURE — 3008F BODY MASS INDEX DOCD: CPT | Performed by: STUDENT IN AN ORGANIZED HEALTH CARE EDUCATION/TRAINING PROGRAM

## 2024-03-06 PROCEDURE — 99213 OFFICE O/P EST LOW 20 MIN: CPT | Mod: GE | Performed by: STUDENT IN AN ORGANIZED HEALTH CARE EDUCATION/TRAINING PROGRAM

## 2024-03-06 PROCEDURE — 99213 OFFICE O/P EST LOW 20 MIN: CPT | Performed by: STUDENT IN AN ORGANIZED HEALTH CARE EDUCATION/TRAINING PROGRAM

## 2024-03-06 RX ORDER — GUAIFENESIN/DEXTROMETHORPHAN 100-10MG/5
5 SYRUP ORAL 3 TIMES DAILY PRN
Qty: 118 ML | Refills: 0 | Status: SHIPPED | OUTPATIENT
Start: 2024-03-06 | End: 2024-03-16

## 2024-03-06 NOTE — LETTER
March 6, 2024     Patient: Nick Whiteside   YOB: 2005   Date of Visit: 3/6/2024       To Whom It May Concern:    Nick Whiteside was seen in my clinic on 3/6/2024 at 1:45 pm. Please excuse Nick for her absence from work on this day to make the appointment.    If you have any questions or concerns, please don't hesitate to call.         Sincerely,         Daphney Euceda MD

## 2024-03-06 NOTE — PROGRESS NOTES
HPI: Nick Whiteside is a 18 y.o. female with PMH Seizures, asthma, mild depression, obesity, presenting to [facility] acute care with acute cough. Patient states was in the ED in January for a cough and was swabbed for covid and flu and it was negative. She was told she had a viral infection and to use motrin as needed. She then was seen in peds clinic in feb and still had the cough at that time. She was prescribed flonase and encouraged to use her inhalers. She states her cough went away on feb 16 and then returned on feb 28. She denies any systemic symptoms like fevers and chills. Denies any uri like symptoms. She endorses waking up every night coughing. She has been using her dulera daily and her albuterol intermittently. Cough is productive with a clear sputum.    ROS negative except for above mentioned.       Past Medical History: No past medical history on file.   Past Surgical History:   Past Surgical History:   Procedure Laterality Date    TONSILLECTOMY  05/19/2017    Tonsillectomy With Adenoidectomy      Medications:    Current Outpatient Medications   Medication Instructions    albuterol 90 mcg/actuation inhaler 2 puffs, inhalation, Every 4 hours PRN    calcium carbonate-vitamin D3 (Os-Ami 500 + D3) 500 mg-5 mcg (200 unit) tablet 1 tablet, oral, Daily    diazePAM (Valtoco) 20 mg/2 spray spray,non-aerosol nasal spray 1 spray, Each Nostril, Once, For Seizure longer than 3 min.    fluticasone (Flonase) 50 mcg/actuation nasal spray 1 spray, Each Nostril, Daily    folic acid (FOLVITE) 0.4 mg, oral, Daily    levETIRAcetam XR (KEPPRA XR) 1,000 mg, oral, Daily    mometasone-formoterol (Dulera 100) 100-5 mcg/actuation inhaler 2 puffs, inhalation, 2 times daily RT, Rinse mouth with water after use to reduce aftertaste and incidence of candidiasis. Do not swallow.    multivitamin tablet 1 tablet, oral, Daily    nasal spray midazolam (Nayzilam) 5 mg/spray (0.1 mL) spray,non-aerosol nasal    norgestrel-ethinyl  estradioL (Cryselle, 28,) 0.3-30 mg-mcg tablet 1 tablet, oral, Daily    norgestrel-ethinyl estradioL (Low-Ogestrel, 28,) 0.3-30 mg-mcg tablet 1 tablet, oral, Daily      Allergies:   Allergies   Allergen Reactions    Banana Swelling    Watermelon Swelling      Immunizations:   Immunization History   Administered Date(s) Administered    DTaP vaccine, pediatric  (INFANRIX) 2005, 2005, 02/01/2005, 08/19/2008    DTaP, Unspecified 2005    Flu vaccine (IIV4), preservative free *Check age/dose* 01/05/2017, 12/06/2021, 12/13/2022, 10/03/2023    HPV 9-valent vaccine (GARDASIL 9) 03/15/2016    HPV, Quadrivalent 11/14/2013, 01/23/2015    Hepatitis A vaccine, pediatric/adolescent (HAVRIX, VAQTA) 06/11/2010, 06/13/2011    Hepatitis B vaccine, pediatric/adolescent (RECOMBIVAX, ENGERIX) 2005, 2005, 2003    HiB PRP-OMP conjugate vaccine, pediatric (PEDVAXHIB) 2005, 2003, 01/27/2004    HiB PRP-T conjugate vaccine (HIBERIX, ACTHIB) 02/01/2005    Influenza Whole 2003, 01/27/2004    Influenza, Unspecified 02/01/2005, 10/03/2018, 10/08/2019, 11/17/2020    Influenza, live, intranasal 11/02/2009, 10/18/2010, 11/10/2011, 10/08/2012, 01/23/2015, 03/15/2016    Influenza, live, intranasal, quadrivalent 11/14/2013    Influenza, seasonal, injectable, preservative free 11/10/2005    MMR vaccine, subcutaneous (MMR II) 06/29/2004, 08/19/2008    Meningococcal ACWY vaccine (MENVEO) 01/23/2015    Meningococcal B, Unspecified 10/08/2019, 11/17/2020, 08/05/2021    Meningococcal MCV4P 10/08/2019    Moderna SARS-CoV-2 Vaccination 07/18/2021, 08/15/2021    Pfizer Purple Cap SARS-CoV-2 01/27/2022    Pneumococcal Conjugate PCV 7 2005, 2003, 2003, 02/01/2005    Poliovirus vaccine, subcutaneous (IPOL) 2003, 2003, 2003, 08/19/2008    Tdap vaccine, age 7 year and older (BOOSTRIX, ADACEL) 01/23/2015    Varicella vaccine, subcutaneous (VARIVAX) 06/29/2004, 08/19/2008      Family History: denies family history pertinent to presenting problem  Family History   Problem Relation Name Age of Onset    Other (antiphospholipid syndrome) Mother      Asthma Mother      Diabetes Father      Asthma Brother      Seizures Brother      Seizures Mother's Brother      Asthma Mother's Brother      Hypertension Maternal Grandfather      Hypertension Maternal Great-Grandfather      Asthma Other Family hx     Birth defects Other Family hx       /School: school      Visit Vitals  /78   Pulse 99   Temp 36.4 °C (97.6 °F) (Temporal)   Resp 20   Wt (!) 154 kg (338 lb 6.5 oz)   SpO2 98%   BMI 54.39 kg/m²   OB Status Having periods   Smoking Status Never   BSA 2.68 m²       Physical Exam  General: Well developed, well nourished, alert and cooperative, appears to be in no acute distress.   HEENT: Normocephalic, atraumatic. No nasal discharge. Posterior oropharynx without erythema or exudates.   Cardiac: Normal S1 and S2.   Lungs: Clear to auscultation bilaterally. No rales, rhonchi, wheezing, diminished breath sounds.   MSK: ROM intact spine and extremities.  Neuro: CN II-XII grossly intact.  Psych: Demonstrates good judgement and reason.     Assessment and Plan:   Nick Whiteside is a 18 y.o. female with PMH Seizures, asthma, mild depression, obesity, presenting to [facility] acute care with concern for cough. On arrival Nick Whiteside was afebrile, HDS, well appearing, and in no acute distress. Exam largely unremarkable, however patient was coughing intermittently during Hpi. Most likely etiology of presentation is viral cough given absence of other systemic signs and acute onset vs exacerbation of her asthma. Plan for symptomatic treatment only at this time with motrin prn, cough drops and robitussin Dm. Discussed with patient importance of follow up in clinic within the next month if symptoms do not improve, she would benefit from pfts and possible titration of her asthma medications. Patient  in agreement with plan.      Discussed the expected time course of symptoms and gave return precautions. Advised follow-up if symptoms worsen. Parents/Guardian agreeable with plan.     Pt discussed with Dr. Bhavesh Euceda MD  Family Medicine, PGY-3

## 2024-06-20 DIAGNOSIS — Z30.41 ENCOUNTER FOR SURVEILLANCE OF CONTRACEPTIVE PILLS: ICD-10-CM

## 2024-06-20 DIAGNOSIS — J45.20 MILD INTERMITTENT ASTHMA WITHOUT COMPLICATION (HHS-HCC): ICD-10-CM

## 2024-06-20 RX ORDER — ALBUTEROL SULFATE 90 UG/1
2 AEROSOL, METERED RESPIRATORY (INHALATION) EVERY 4 HOURS PRN
Qty: 18 G | Refills: 1 | Status: SHIPPED | OUTPATIENT
Start: 2024-06-20

## 2024-06-20 RX ORDER — NORGESTREL AND ETHINYL ESTRADIOL 0.3-0.03MG
1 KIT ORAL DAILY
Qty: 28 TABLET | Refills: 0 | Status: SHIPPED | OUTPATIENT
Start: 2024-06-20

## 2024-07-02 ENCOUNTER — APPOINTMENT (OUTPATIENT)
Dept: PEDIATRICS | Facility: CLINIC | Age: 19
End: 2024-07-02
Payer: COMMERCIAL

## 2024-07-30 ENCOUNTER — OFFICE VISIT (OUTPATIENT)
Dept: PEDIATRICS | Facility: CLINIC | Age: 19
End: 2024-07-30
Payer: COMMERCIAL

## 2024-07-30 ENCOUNTER — LAB (OUTPATIENT)
Dept: LAB | Facility: LAB | Age: 19
End: 2024-07-30
Payer: COMMERCIAL

## 2024-07-30 VITALS
WEIGHT: 293 LBS | TEMPERATURE: 97.9 F | HEART RATE: 82 BPM | OXYGEN SATURATION: 98 % | SYSTOLIC BLOOD PRESSURE: 122 MMHG | RESPIRATION RATE: 17 BRPM | DIASTOLIC BLOOD PRESSURE: 78 MMHG | HEIGHT: 66 IN | BODY MASS INDEX: 47.09 KG/M2

## 2024-07-30 DIAGNOSIS — E66.01 CLASS 3 OBESITY (MULTI): ICD-10-CM

## 2024-07-30 DIAGNOSIS — M25.473 ANKLE SWELLING, UNSPECIFIED LATERALITY: Primary | ICD-10-CM

## 2024-07-30 DIAGNOSIS — Z13.1 ENCOUNTER FOR SCREENING EXAMINATION FOR IMPAIRED GLUCOSE REGULATION AND DIABETES MELLITUS: ICD-10-CM

## 2024-07-30 DIAGNOSIS — M25.473 ANKLE SWELLING, UNSPECIFIED LATERALITY: ICD-10-CM

## 2024-07-30 DIAGNOSIS — E66.01 SEVERE OBESITY DUE TO EXCESS CALORIES WITHOUT SERIOUS COMORBIDITY WITH BODY MASS INDEX (BMI) GREATER THAN 99TH PERCENTILE FOR AGE IN PEDIATRIC PATIENT (MULTI): ICD-10-CM

## 2024-07-30 DIAGNOSIS — Z13.29 SCREENING FOR THYROID DISORDER: ICD-10-CM

## 2024-07-30 DIAGNOSIS — Z30.41 ENCOUNTER FOR SURVEILLANCE OF CONTRACEPTIVE PILLS: ICD-10-CM

## 2024-07-30 DIAGNOSIS — Z13.220 NEED FOR LIPID SCREENING: ICD-10-CM

## 2024-07-30 LAB
25(OH)D3 SERPL-MCNC: 7 NG/ML (ref 30–100)
ALBUMIN SERPL BCP-MCNC: 3.9 G/DL (ref 3.4–5)
ALP SERPL-CCNC: 81 U/L (ref 33–110)
ALT SERPL W P-5'-P-CCNC: 9 U/L (ref 7–45)
ANION GAP SERPL CALC-SCNC: 11 MMOL/L (ref 10–20)
AST SERPL W P-5'-P-CCNC: 15 U/L (ref 9–39)
BASOPHILS # BLD AUTO: 0.04 X10*3/UL (ref 0–0.1)
BASOPHILS NFR BLD AUTO: 0.5 %
BILIRUB SERPL-MCNC: 0.4 MG/DL (ref 0–1.2)
BNP SERPL-MCNC: 10 PG/ML (ref 0–99)
BUN SERPL-MCNC: 10 MG/DL (ref 6–23)
CALCIUM SERPL-MCNC: 8.6 MG/DL (ref 8.6–10.6)
CHLORIDE SERPL-SCNC: 102 MMOL/L (ref 98–107)
CHOLEST SERPL-MCNC: 147 MG/DL (ref 0–199)
CHOLESTEROL/HDL RATIO: 3.2
CO2 SERPL-SCNC: 28 MMOL/L (ref 21–32)
CREAT SERPL-MCNC: 0.56 MG/DL (ref 0.5–1.05)
EGFRCR SERPLBLD CKD-EPI 2021: >90 ML/MIN/1.73M*2
EOSINOPHIL # BLD AUTO: 0.15 X10*3/UL (ref 0–0.7)
EOSINOPHIL NFR BLD AUTO: 1.9 %
ERYTHROCYTE [DISTWIDTH] IN BLOOD BY AUTOMATED COUNT: 14.2 % (ref 11.5–14.5)
EST. AVERAGE GLUCOSE BLD GHB EST-MCNC: 105 MG/DL
GLUCOSE SERPL-MCNC: 96 MG/DL (ref 74–99)
HBA1C MFR BLD: 5.3 %
HCT VFR BLD AUTO: 39.8 % (ref 36–46)
HDLC SERPL-MCNC: 45.5 MG/DL
HGB BLD-MCNC: 12.2 G/DL (ref 12–16)
IMM GRANULOCYTES # BLD AUTO: 0.02 X10*3/UL (ref 0–0.7)
IMM GRANULOCYTES NFR BLD AUTO: 0.3 % (ref 0–0.9)
LDLC SERPL CALC-MCNC: 92 MG/DL
LYMPHOCYTES # BLD AUTO: 1.61 X10*3/UL (ref 1.2–4.8)
LYMPHOCYTES NFR BLD AUTO: 20.6 %
MCH RBC QN AUTO: 24.2 PG (ref 26–34)
MCHC RBC AUTO-ENTMCNC: 30.7 G/DL (ref 32–36)
MCV RBC AUTO: 79 FL (ref 80–100)
MONOCYTES # BLD AUTO: 0.73 X10*3/UL (ref 0.1–1)
MONOCYTES NFR BLD AUTO: 9.3 %
NEUTROPHILS # BLD AUTO: 5.26 X10*3/UL (ref 1.2–7.7)
NEUTROPHILS NFR BLD AUTO: 67.4 %
NON HDL CHOLESTEROL: 102 MG/DL (ref 0–149)
NRBC BLD-RTO: 0 /100 WBCS (ref 0–0)
PLATELET # BLD AUTO: 371 X10*3/UL (ref 150–450)
POC APPEARANCE, URINE: ABNORMAL
POC BILIRUBIN, URINE: ABNORMAL
POC BLOOD, URINE: ABNORMAL
POC COLOR, URINE: YELLOW
POC GLUCOSE, URINE: NEGATIVE MG/DL
POC KETONES, URINE: NEGATIVE MG/DL
POC LEUKOCYTES, URINE: NEGATIVE
POC NITRITE,URINE: NEGATIVE
POC PH, URINE: 7 PH
POC PROTEIN, URINE: ABNORMAL MG/DL
POC SPECIFIC GRAVITY, URINE: 1.02
POC UROBILINOGEN, URINE: 1 EU/DL
POTASSIUM SERPL-SCNC: 4.1 MMOL/L (ref 3.5–5.3)
PROT SERPL-MCNC: 7.6 G/DL (ref 6.4–8.2)
RBC # BLD AUTO: 5.04 X10*6/UL (ref 4–5.2)
SODIUM SERPL-SCNC: 137 MMOL/L (ref 136–145)
TRIGL SERPL-MCNC: 50 MG/DL (ref 0–149)
TSH SERPL-ACNC: 3.12 MIU/L (ref 0.44–3.98)
VLDL: 10 MG/DL (ref 0–40)
WBC # BLD AUTO: 7.8 X10*3/UL (ref 4.4–11.3)

## 2024-07-30 PROCEDURE — 85025 COMPLETE CBC W/AUTO DIFF WBC: CPT

## 2024-07-30 PROCEDURE — 80061 LIPID PANEL: CPT

## 2024-07-30 PROCEDURE — 83880 ASSAY OF NATRIURETIC PEPTIDE: CPT

## 2024-07-30 PROCEDURE — 84443 ASSAY THYROID STIM HORMONE: CPT

## 2024-07-30 PROCEDURE — 81002 URINALYSIS NONAUTO W/O SCOPE: CPT | Performed by: PEDIATRICS

## 2024-07-30 PROCEDURE — 3008F BODY MASS INDEX DOCD: CPT | Performed by: PEDIATRICS

## 2024-07-30 PROCEDURE — 80053 COMPREHEN METABOLIC PANEL: CPT

## 2024-07-30 PROCEDURE — 99214 OFFICE O/P EST MOD 30 MIN: CPT | Performed by: PEDIATRICS

## 2024-07-30 PROCEDURE — 36415 COLL VENOUS BLD VENIPUNCTURE: CPT

## 2024-07-30 PROCEDURE — 99214 OFFICE O/P EST MOD 30 MIN: CPT | Mod: GC | Performed by: PEDIATRICS

## 2024-07-30 PROCEDURE — 82306 VITAMIN D 25 HYDROXY: CPT

## 2024-07-30 PROCEDURE — 83036 HEMOGLOBIN GLYCOSYLATED A1C: CPT

## 2024-07-30 RX ORDER — NORGESTREL AND ETHINYL ESTRADIOL 0.3-0.03MG
1 KIT ORAL DAILY
Qty: 28 TABLET | Refills: 12 | Status: CANCELLED | OUTPATIENT
Start: 2024-07-30

## 2024-08-01 DIAGNOSIS — R79.89 LOW SERUM VITAMIN D: Primary | ICD-10-CM

## 2024-08-01 RX ORDER — ERGOCALCIFEROL 1.25 MG/1
50000 CAPSULE ORAL
Qty: 8 CAPSULE | Refills: 0 | Status: SHIPPED | OUTPATIENT
Start: 2024-08-04 | End: 2024-09-23

## 2024-08-29 DIAGNOSIS — G40.309 GENERALIZED EPILEPSY (MULTI): ICD-10-CM

## 2024-08-29 RX ORDER — LEVETIRACETAM 500 MG/1
1000 TABLET, EXTENDED RELEASE ORAL DAILY
Qty: 60 TABLET | Refills: 0 | Status: SHIPPED | OUTPATIENT
Start: 2024-08-29 | End: 2024-09-28

## 2024-09-24 DIAGNOSIS — J45.20 MILD INTERMITTENT ASTHMA WITHOUT COMPLICATION (HHS-HCC): ICD-10-CM

## 2024-09-24 DIAGNOSIS — R79.89 LOW SERUM VITAMIN D: ICD-10-CM

## 2024-09-24 RX ORDER — ERGOCALCIFEROL 1.25 MG/1
50000 CAPSULE ORAL
Qty: 4 CAPSULE | Refills: 0 | OUTPATIENT
Start: 2024-09-29 | End: 2024-11-18

## 2024-09-24 RX ORDER — MOMETASONE FUROATE AND FORMOTEROL FUMARATE DIHYDRATE 100; 5 UG/1; UG/1
2 AEROSOL RESPIRATORY (INHALATION) 2 TIMES DAILY
Qty: 13 G | Refills: 11 | OUTPATIENT
Start: 2024-09-24

## 2024-09-27 ENCOUNTER — APPOINTMENT (OUTPATIENT)
Dept: PEDIATRIC NEUROLOGY | Facility: CLINIC | Age: 19
End: 2024-09-27
Payer: COMMERCIAL

## 2024-09-27 DIAGNOSIS — R56.9 SEIZURE (MULTI): ICD-10-CM

## 2024-09-27 DIAGNOSIS — G40.309 GENERALIZED EPILEPSY (MULTI): ICD-10-CM

## 2024-09-27 PROCEDURE — 99214 OFFICE O/P EST MOD 30 MIN: CPT | Performed by: PSYCHIATRY & NEUROLOGY

## 2024-09-27 RX ORDER — FOLIC ACID 0.4 MG
0.4 TABLET ORAL DAILY
Qty: 30 TABLET | Refills: 8 | Status: SHIPPED | OUTPATIENT
Start: 2024-09-27

## 2024-09-27 RX ORDER — LEVETIRACETAM 500 MG/1
1000 TABLET, EXTENDED RELEASE ORAL DAILY
Qty: 60 TABLET | Refills: 7 | Status: SHIPPED | OUTPATIENT
Start: 2024-09-27 | End: 2024-10-27

## 2024-09-27 NOTE — PROGRESS NOTES
Patient Discussion/Summary     Continue Extended release 1000 mg a day     Discussed Nayzilam administration for seizure longer than 5 minutes to replace clonazepam disintegrating tablet     Continue folic acid 4 mcg qd     Follow-up in Feb 2025- with Dr. Corrigan, Please call  adult epilepsy center scheduling line:  [phone]      Please call my office if you have a seizure without skipping Keppra     Discussed seizure precaution      Provider Impressions  Has been seeing Dr. Darcie Clemente, general child neurology.  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 Past EEG showed generalized spike and wave discharges     Seizure Semiology: 1) Left Version --> generalized tonic clonic seizures      Last seizure in May 2017-when she weaned off the Keppra     Admits complete adherence to Keppra.     NO seizure. DOing well on LEV ER 1000 mg QHS. Want to see if we can wean KEppra next year-per patient. Follow up in Jan 2025 with Dr. Corrigan-to establish care*     She is not driving now, graduated from school.     ------------------------------------------------------------------------------------------------------------------------------------------  CONTROLLED SUBSTANCE-DOCUMENTATION     I have personally reviewed the OARRS report. This report is scanned into the electronic medical record. I have considered the risks of abuse, dependence, addiction and diversion. I believe that it is clinically appropriate  to be prescribed this medication.  Also, I believe that it is clinically appropriate for this patient to be prescribed this medication. Based on the patient's condition and response to current treatment regimen, I do not feel that it is clinically necessary for this patient to be seen in the office every 90 days.      (diazepam, clonazepam, IN midazolam)  What is the patient's goal of therapy?  Seizure rescue medicine  Is this being achieved with current  "treatment?  Yes     (clobazam, lacosamide, perampanel, Epidiolex, briviacetam)  What is the patient's goal of therapy?  Seizure treatment  Is this being achieved with current treatment?  Yes     UDS is not clinically indicated.  Assessed for risk of addiction, abuse and/or diversion using \"red flags.\"  Patient was counseled on the abuse potential. Patient was educated on the risk and effect of combining multiple controlled substances. Patient voiced understanding of the risks of combination of opioids and benzodiazepines,  and I discussed alternative treatment options when applicable.    Patient was reminded that it is both unsafe and unlawful to give away or sell controlled substance.  Discussed proper and secure storage and disposal of unused medications.    Seizure precautions:   - CANNOT take baths  - MUST have adult supervision if swimming in pool   - When taking shower there must be adult in house and door must be unlocked  -No anti-gravity activities. Feet need to be always on the ground.  -Sleep deprivation increases risk of having a seizure. So please get minium of 8 hours of sleep.  - If you have a driving license, do not drive for 6 mo for your last seizure.  Seizure Management:   - If he has seizure place on his side   - Do NOT place anything in his mouth   - If seizure lasts > 5 minutes, use rescue medication    Understanding Your Child’s Risk for SUDEP and the need to have as few seizures as possible  WHAT IS SUDEP?   SUDEP is Sudden Unexpected Death in Epilepsy. It is the most common epilepsy-related cause of death. SUDEP refers to the death of a mostly healthy person with epilepsy who dies unexpectedly and there is no clear reason for the death. Scientists and researchers are still learning about SUDEP and its causes. Current research is focused on problems with breathing, heartbeat and brain function after a seizure  WHAT IS MY CHILD’S RISK FOR SUDEP?   Your doctor has determined that your child is " at increased risk for SUDEP. This is because your child has had one or more seizures with stiffness and or jerking while awake or asleep in the past year.  HOW CAN WE STAY LOW RISK?   The best way to prevent SUDEP is to have as few seizures as possible, preferably none.   It is important to follow the recommendations below.   Take medication on time everyday - exactly as prescribed.  Get enough sleep.  Visit a neurologist or epileptologist (epilepsy specialist) regularly, especially if seizures continue.  Discuss additional treatments to reduce the risk of seizures - treatments include additional or alternate medications, surgery, ketogenic diet or devices.  Know your child’s seizure triggers.  Create and share a seizure response plan and seizure first aid.  If your child has seizures at night, consider using a seizure alert device or other monitor to help alert family members if a seizure happens.  WHERE CAN WE LEARN MORE?  Please visit one of our patient-advocate partners:  [email or web address]   [email or web address]   [email or web address]      Chief Complaint     follow up sz   Accompanied by self.      History of Present Illness  The patient is Dr. Darcie Clemente's patient. Patient is being transferred to my care. Below note is from last neurology clinic note.     In the past, rosa isela had tried to wean off her medication and then had a breakthrough seizure after discontinuing Keppra. She seemed off on the morning of the seizure, was staring into space and she dozed off and then had generalized tonic clonic seizure, foaming at the mouth, called 911. Back on her medication  since the seizure on May 2, 2021. No side effects.      Seizure History:  Seizure Semiology: 1) Left Version --> generalized tonic clonic seizures  Seizure History: 2 prior history of seizures, first occurred on [place] Day and consisted of shaking of bilateral upper extremities. The second event occurred on May 19 and  consisted of left head and eye deviation with snoring and gurgling sounds. She was started on Keppra following the second seizure and has not had recurrence.     Last Seizure: 2021     Routine EE/2017: Normal  2017: Normal  Video EEG:   2020: Normal  2019: video EEG for 24 hours. This video EEG is abnormal based on the presence of generalized polyspikes, seen 1-2 times per 4 hours in early stages of sleep. This finding is suggest of potential generalized epileptogenicity. No seizures recorded. No staring spell captured.      Neuroimaging: MRI 2017: Unremarkable Examination  Current AEDs: Keppra XR 1000mg Daily  Past AEDs: None  Rescue Medication: Nayzilam  ------------------------  As of 3/24/2022  She admits complete adherence to Keppra  Last seizure in May 2021 when she stopped Keppra  She does not drive now     24 hr VEEG The vEEG was normal. There were no seizures, epileptiform discharges or lateralizing signs seen. Keppra level was subtherapeutic 4.Given that she has not any seizures on a subtherapeutic dose of Keppra, the decision was made to lower her Keppra ER dose to 750mg QD.   _______________________________________________  As of 24   Now taking Keppra XR 1000 mg every day  Not driving   _______________________________________________  As of 24   Diagnosis: Generalized epilepsy  taking Keppra XR 1000 mg every day, admits to good complete adherence to medications.  Not driving  Last seizure in May 2021 when she stopped Keppra  Past AEDs: None  Rescue Medication: Nayzilam    REVIEW OF SYSTEMS:A complete review of systems was performed and was negative for complaint with the exception of that noted above.       EXAM  The following is an exam by telemedicine evaluation based on observation.     Mental status: normal   Speech: Normal comprehension, naming, repetition   Cranial Nerves:   Visual Fields: untestable   EOM: intact                 Pupils: untestable                  Face: symmetric smile                 Hearing: intact to voice                 Palate: untestable                 Shoulders: symmetric shoulder shrug                  Tongue: midline   Motor exam: No tremor                 Arms: No pronator drift                   Legs: Can hop on a foot w/o lose of balance   Sensory exam: untestable   Cerebellar:                   Not tested   Reflexes: untestable

## 2024-10-16 DIAGNOSIS — R56.9 SEIZURE (MULTI): ICD-10-CM

## 2024-10-16 DIAGNOSIS — E55.9 VITAMIN D DEFICIENCY: ICD-10-CM

## 2024-10-24 RX ORDER — MULTIVITAMIN
1 TABLET ORAL DAILY
Qty: 30 TABLET | Refills: 11 | Status: SHIPPED | OUTPATIENT
Start: 2024-10-24

## 2024-11-13 ENCOUNTER — TELEPHONE (OUTPATIENT)
Dept: PEDIATRIC NEUROLOGY | Facility: CLINIC | Age: 19
End: 2024-11-13
Payer: COMMERCIAL

## 2024-11-13 NOTE — TELEPHONE ENCOUNTER
PA submitted to [facility] via fax for Keppra XR 500mg tabs     (PA started by [facility] via [facility] )  Paper copy faxed to [place].

## 2024-11-14 NOTE — TELEPHONE ENCOUNTER
Denial rec'd.    Revised PA resubmitted to [place] via fax LEV XR 500mg due to incorrect info on previous submittal.     Await determination.

## 2024-11-15 NOTE — TELEPHONE ENCOUNTER
Denial rec'd for KEPPRA XR.    Spoke with [place], previous approval was for generic not brand. Ok'd to use levetiracetam ER  For this submission (per MELONY/RN).     AWAIT APPROVAL for Levetiracetam ER 500mg.

## 2025-01-08 ENCOUNTER — APPOINTMENT (OUTPATIENT)
Dept: OBSTETRICS AND GYNECOLOGY | Facility: CLINIC | Age: 20
End: 2025-01-08
Payer: COMMERCIAL

## 2025-02-03 ENCOUNTER — APPOINTMENT (OUTPATIENT)
Dept: OBSTETRICS AND GYNECOLOGY | Facility: CLINIC | Age: 20
End: 2025-02-03
Payer: COMMERCIAL

## 2025-02-03 ENCOUNTER — APPOINTMENT (OUTPATIENT)
Dept: NEUROLOGY | Facility: HOSPITAL | Age: 20
End: 2025-02-03
Payer: COMMERCIAL

## 2025-02-18 ENCOUNTER — OFFICE VISIT (OUTPATIENT)
Dept: PEDIATRICS | Facility: CLINIC | Age: 20
End: 2025-02-18
Payer: COMMERCIAL

## 2025-02-18 ENCOUNTER — NUTRITION (OUTPATIENT)
Dept: PEDIATRICS | Facility: CLINIC | Age: 20
End: 2025-02-18

## 2025-02-18 VITALS
RESPIRATION RATE: 20 BRPM | SYSTOLIC BLOOD PRESSURE: 119 MMHG | WEIGHT: 293 LBS | HEART RATE: 78 BPM | HEIGHT: 66 IN | DIASTOLIC BLOOD PRESSURE: 78 MMHG | BODY MASS INDEX: 47.09 KG/M2 | TEMPERATURE: 97.3 F

## 2025-02-18 DIAGNOSIS — R29.818 SUSPECTED SLEEP APNEA: ICD-10-CM

## 2025-02-18 DIAGNOSIS — T78.40XD ALLERGY, SUBSEQUENT ENCOUNTER: ICD-10-CM

## 2025-02-18 DIAGNOSIS — E66.813 CLASS 3 SEVERE OBESITY DUE TO EXCESS CALORIES WITH BODY MASS INDEX (BMI) OF 50.0 TO 59.9 IN ADULT, UNSPECIFIED WHETHER SERIOUS COMORBIDITY PRESENT: ICD-10-CM

## 2025-02-18 DIAGNOSIS — E55.9 VITAMIN D DEFICIENCY: ICD-10-CM

## 2025-02-18 DIAGNOSIS — E66.01 CLASS 3 SEVERE OBESITY DUE TO EXCESS CALORIES WITH BODY MASS INDEX (BMI) OF 50.0 TO 59.9 IN ADULT, UNSPECIFIED WHETHER SERIOUS COMORBIDITY PRESENT: ICD-10-CM

## 2025-02-18 DIAGNOSIS — N91.1 AMENORRHEA, SECONDARY: Primary | ICD-10-CM

## 2025-02-18 DIAGNOSIS — J45.20 MILD INTERMITTENT ASTHMA WITHOUT COMPLICATION (HHS-HCC): ICD-10-CM

## 2025-02-18 DIAGNOSIS — R56.9 SEIZURE (MULTI): ICD-10-CM

## 2025-02-18 PROCEDURE — 90656 IIV3 VACC NO PRSV 0.5 ML IM: CPT | Performed by: PEDIATRICS

## 2025-02-18 PROCEDURE — 3008F BODY MASS INDEX DOCD: CPT | Performed by: PEDIATRICS

## 2025-02-18 PROCEDURE — 99214 OFFICE O/P EST MOD 30 MIN: CPT | Performed by: PEDIATRICS

## 2025-02-18 PROCEDURE — 99214 OFFICE O/P EST MOD 30 MIN: CPT | Mod: GC,25 | Performed by: PEDIATRICS

## 2025-02-18 RX ORDER — MOMETASONE FUROATE AND FORMOTEROL FUMARATE DIHYDRATE 100; 5 UG/1; UG/1
2 AEROSOL RESPIRATORY (INHALATION) 2 TIMES DAILY
Qty: 13 G | Refills: 11 | Status: SHIPPED | OUTPATIENT
Start: 2025-02-18

## 2025-02-18 RX ORDER — CHOLECALCIFEROL (VITAMIN D3) 25 MCG
1000 TABLET ORAL DAILY
Qty: 30 TABLET | Refills: 11 | Status: SHIPPED | OUTPATIENT
Start: 2025-02-18 | End: 2026-02-18

## 2025-02-18 RX ORDER — NORGESTIMATE AND ETHINYL ESTRADIOL 0.25-0.035
1 KIT ORAL DAILY
Qty: 28 TABLET | Refills: 11 | Status: SHIPPED | OUTPATIENT
Start: 2025-02-18 | End: 2026-02-18

## 2025-02-18 RX ORDER — FLUTICASONE PROPIONATE 50 MCG
1 SPRAY, SUSPENSION (ML) NASAL DAILY
Qty: 16 G | Refills: 3 | Status: SHIPPED | OUTPATIENT
Start: 2025-02-18

## 2025-02-18 ASSESSMENT — PAIN SCALES - GENERAL: PAINLEVEL_OUTOF10: 0-NO PAIN

## 2025-02-18 NOTE — PROGRESS NOTES
Subjective   Nick Whiteside is a 19 y.o. female presenting for follow-up.    Throws up after taking calcium + vitamin D supplement, would like to try a different supplement. Still living at home with mold present in home, trying to find a new place to live. Currently lives with mother and brother.     OCPs  Current on Turqoz for management of amenorrhea, PCOS. LMP 12/19. Previously periods had been monthly and regular. She is concerned about not having a period since December. Never been sexually active.     Weight management  Was referred to Dr. Benavidez at last appointment for weight management. She is still interested in pursuing medications for weight.    Nutrition  Eats varied diet including fruits and veggies. She works at [employer], gets lunch from there while at work. Notices that she eats more when mood is low.     24 hour food recall  Breakfast: [employer]  Lunch: double cheeseburger, fries, Sprite from [employer]  Snack: PB&J  Dinner: hamburger helper  Drinks: water    Activity   Standing and walking around at work ([employer]).     Lifestyle goals from last visit:  Nutrition Goal: Add a fruit or vegetable to lunch and dinner 5 days per week until Dr. Benavidez appt  - She incorporates fruits and veggies but not with every lunch and dinner  Movement Goal: 15 minutes of physical activity, 3 days per week (gym with brother, walking up and down stairs or around the block)  - Did not start doing physical activity. A barrier to physical activity is that she is sleepy throughout the day and does not feel like she has energy to do anything extra. She is interested in adding something more such as yoga.  - Updated movement goal: 10-15 minutes of yoga on Saturday and Sunday    Sleep  Loves to sleep. Snores. Can wake up gasping sometimes. Feels very sleepy throughout the day. Goes to bed between 8-11pm, wakes up at 6am daily.    Mood  Endorses that mood is overall good. Denies feelings of guilt, hopelessness, feeling  "down, anxiety.     Objective   Visit Vitals  /78   Pulse 78   Temp 36.3 °C (97.3 °F)   Resp 20   Ht 1.68 m (5' 6.14\")   Wt (!) 165 kg (364 lb 6.7 oz)   LMP  (LMP Unknown)   BMI 58.57 kg/m²   OB Status Having periods   Smoking Status Never   BSA 2.77 m²         Physical Exam  Constitutional:       Appearance: Normal appearance.   HENT:      Nose: Nose normal.      Mouth/Throat:      Mouth: Mucous membranes are moist.   Eyes:      Conjunctiva/sclera: Conjunctivae normal.   Cardiovascular:      Rate and Rhythm: Normal rate and regular rhythm.      Pulses: Normal pulses.      Heart sounds: Normal heart sounds.   Pulmonary:      Effort: Pulmonary effort is normal.      Breath sounds: Normal breath sounds.   Abdominal:      General: There is no distension.      Palpations: Abdomen is soft.      Tenderness: There is no abdominal tenderness.   Skin:     General: Skin is warm.   Neurological:      Mental Status: She is alert.      Gait: Gait normal.   Psychiatric:         Mood and Affect: Mood normal.         Behavior: Behavior normal.        Assessment/Plan   18yo F with severe obesity (BMI 58.57), epilepsy, PCOS, mild intermittent asthma, allergies, vitamin D deficiency presenting for follow-up of weight management, OCPs. Nick is still interested in starting a medication for assistance with weight management. She has normal HbA1c, lipids, BP. Starting phentermine today and initial appointment with Dr. Benavidez scheduled. Discussed the importance of lifestyle modifications in conjunction with medication. Lifestyle goals identified. There is concern for sleep apnea due to snoring, gasping, excessive daytime sleepiness. Referring to sleep medicine and ENT at this time for further evaluation. Periods have been regular while on Truqoz, currently not having periods even when taking the placebo week of medication. May be due to inadequate dose for weight vs thin endometrium, will transition to Sprintec.    #Elevated BMI  - " Start phentermine 8mg daily  - Dietitian Marsha ALLEN met with patient today  - Lifestyle goals:   Nutrition Goal: Add a fruit or vegetable to lunch and dinner 5 days per week until Dr. Benavidez appt  Movement Goal: 15 minutes of yoga on Saturday and Sunday  - Initial appt with Dr. Benavidez scheduled for 4/17  - Appt with Marsha Trivedi RD 4/17    #Suspected NEEMA  - Referral to sleep medicine, ENT    #Amenorrhea, PCOS  - Stop Truqoz, start Sprintec    #Vit D deficiency  - Continue vitamin D    Flu shot given today    Follow-up with Dr. Dodd in 3 months    Seen and discussed with attending, Dr. Dodd.    Luda Colindres MD  Pediatrics, PGY-2

## 2025-02-18 NOTE — PATIENT INSTRUCTIONS
Weight management  - Start phentermine daily. If you notice difficulty sleeping, racing heart, or other concerning side effects, stop the medication and call our office or message Dr. Dodd on MyChart  - Continue working on nutrition and physical activity  Nutrition Goal: Add a fruit or vegetable to lunch and dinner 5 days per week until Dr. Benavidez appt  Movement Goal: 15 minutes of yoga on Saturday and Sunday  - Your appt with Dr. Benavidez was scheduled for 4/17, she is the doctor who specializes in weight management    Sleep  You have referrals to sleep medicine and ENT to evaluate you for sleep apnea (pausing in breathing while sleeping). Call [phone] to schedule these appointments  - Start using Flonase daily if you are not already    Periods  Your birth control was switched to one called [facility]. Follow-up with Dr. Dodd in 3 months to talk about how this change is going.

## 2025-02-26 ENCOUNTER — OFFICE VISIT (OUTPATIENT)
Dept: OBSTETRICS AND GYNECOLOGY | Facility: CLINIC | Age: 20
End: 2025-02-26
Payer: COMMERCIAL

## 2025-02-26 VITALS
SYSTOLIC BLOOD PRESSURE: 113 MMHG | DIASTOLIC BLOOD PRESSURE: 78 MMHG | HEART RATE: 80 BPM | WEIGHT: 293 LBS | BODY MASS INDEX: 59.3 KG/M2

## 2025-02-26 DIAGNOSIS — E28.2 PCOS (POLYCYSTIC OVARIAN SYNDROME): ICD-10-CM

## 2025-02-26 DIAGNOSIS — Z01.419 ENCOUNTER FOR GYNECOLOGICAL EXAMINATION WITHOUT ABNORMAL FINDING: Primary | ICD-10-CM

## 2025-02-26 PROCEDURE — 99385 PREV VISIT NEW AGE 18-39: CPT | Performed by: ADVANCED PRACTICE MIDWIFE

## 2025-02-26 PROCEDURE — 1036F TOBACCO NON-USER: CPT | Performed by: ADVANCED PRACTICE MIDWIFE

## 2025-02-26 ASSESSMENT — ENCOUNTER SYMPTOMS
ENDOCRINE NEGATIVE: 0
PSYCHIATRIC NEGATIVE: 0
GASTROINTESTINAL NEGATIVE: 0
CONSTITUTIONAL NEGATIVE: 0
CARDIOVASCULAR NEGATIVE: 0
RESPIRATORY NEGATIVE: 0
NEUROLOGICAL NEGATIVE: 0
EYES NEGATIVE: 0
HEMATOLOGIC/LYMPHATIC NEGATIVE: 0
ALLERGIC/IMMUNOLOGIC NEGATIVE: 0
MUSCULOSKELETAL NEGATIVE: 0

## 2025-02-26 ASSESSMENT — PAIN SCALES - GENERAL: PAINLEVEL_OUTOF10: 0-NO PAIN

## 2025-02-26 NOTE — PROGRESS NOTES
Subjective   Nick Whiteside is a 19 y.o. female who is here for Annual Exam (Pt here for annual exam/Lmp-Unknown /Last pap-no pap history/Std-declined/Chaperone-accepted ).     Concerns today:  First pelvic exam, feeling nervous    Periods are regular every 28-30 days, lasting 5 days on COCs.   Dysmenorrhea:none.   Cyclic symptoms include none.      Sexual Activity: never sexually active    History of prior STI: none  Desires STI screening? No    Current contraception: abstinence, on COCs for menstrual regulation in s/o PCOS    Last pap: never had pap  Family history of uterine or ovarian cancer: no  Family history of breast cancer: no  OB History    Para Term  AB Living   0 0 0 0 0 0   SAB IAB Ectopic Multiple Live Births   0 0 0 0 0     Objective   /78   Pulse 80   Wt (!) 167 kg (369 lb)   LMP  (LMP Unknown)   Breastfeeding No   BMI 59.30 kg/m²   Physical Exam  Constitutional:       General: She is not in acute distress.     Appearance: Normal appearance. She is well-developed.   Genitourinary:      Urethral meatus normal.      No lesions in the vagina.      Genitourinary Comments: Pt not able to tolerate pelvic exam, blind pap obtained      Right Labia: skin changes (scaling noted on inner thighs, perineal skin d/t chafing).      Right Labia: No rash or lesions.     Left Labia: skin changes (scaling noted on inner thighs, perineal skin d/t chafing).      Left Labia: No lesions or rash.     No vaginal discharge, erythema or bleeding.      No vaginal prolapse present.     No vaginal atrophy present.     Pelvic exam was performed with patient in the lithotomy position.   Breasts:     Right: Normal.      Left: Normal.   Neck:      Thyroid: No thyroid mass, thyromegaly or thyroid tenderness.   Cardiovascular:      Heart sounds: Normal heart sounds.   Pulmonary:      Effort: Pulmonary effort is normal.      Breath sounds: Normal breath sounds.   Abdominal:      Palpations: Abdomen is soft.  There is no mass.      Tenderness: There is no abdominal tenderness.   Lymphadenopathy:      Cervical: No cervical adenopathy.   Neurological:      General: No focal deficit present.   Skin:     General: Skin is warm and dry.   Psychiatric:         Mood and Affect: Mood and affect normal.         Behavior: Behavior is cooperative.   Vitals reviewed.     Assessment/Plan   Diagnoses and all orders for this visit:  Encounter for gynecological examination without abnormal finding  -     routine AE  -     healthy lifestyle encouraged  -     pap collected  -     condoms as needed for STI prevention  -     Gardasil UTD  -     Recommend PCP visit for routine health maintenance  -     RTO 1 year for AE or sooner PRN  PCOS (polycystic ovarian syndrome)        -     dx in 2022 by elevated testosterone, irregular menses        -     managed with KENAN by PCP, pt happy with this method        -     metabolic screen 7/2024 WNL    Follow up in about 1 year (around 2/26/2026) for Annual Exam.    Kate Friedman, APRN-CNM, APRN-CNP

## 2025-03-07 LAB
CYTOLOGY CMNT CVX/VAG CYTO-IMP: NORMAL
LAB AP CONTRACEPTIVE HISTORY: NORMAL
LAB AP HPV HR: NORMAL
LABORATORY COMMENT REPORT: NORMAL
PATH REPORT.TOTAL CANCER: NORMAL

## 2025-04-14 ENCOUNTER — APPOINTMENT (OUTPATIENT)
Dept: NEUROLOGY | Facility: HOSPITAL | Age: 20
End: 2025-04-14
Payer: COMMERCIAL

## 2025-04-17 ENCOUNTER — APPOINTMENT (OUTPATIENT)
Dept: PEDIATRICS | Facility: CLINIC | Age: 20
End: 2025-04-17
Payer: COMMERCIAL

## 2025-04-29 DIAGNOSIS — G40.309 GENERALIZED EPILEPSY (MULTI): ICD-10-CM

## 2025-04-29 RX ORDER — LEVETIRACETAM 500 MG/1
1000 TABLET, EXTENDED RELEASE ORAL DAILY
Qty: 60 TABLET | Refills: 0 | Status: SHIPPED | OUTPATIENT
Start: 2025-04-29 | End: 2025-05-29

## 2025-05-15 ENCOUNTER — OFFICE VISIT (OUTPATIENT)
Dept: PEDIATRICS | Facility: CLINIC | Age: 20
End: 2025-05-15
Payer: COMMERCIAL

## 2025-05-15 VITALS
DIASTOLIC BLOOD PRESSURE: 73 MMHG | HEIGHT: 66 IN | BODY MASS INDEX: 47.09 KG/M2 | TEMPERATURE: 97 F | RESPIRATION RATE: 22 BRPM | HEART RATE: 98 BPM | SYSTOLIC BLOOD PRESSURE: 111 MMHG | WEIGHT: 293 LBS

## 2025-05-15 DIAGNOSIS — R56.9 SEIZURE (MULTI): ICD-10-CM

## 2025-05-15 DIAGNOSIS — E55.9 VITAMIN D DEFICIENCY: ICD-10-CM

## 2025-05-15 DIAGNOSIS — L83 ACANTHOSIS NIGRICANS: ICD-10-CM

## 2025-05-15 DIAGNOSIS — N91.1 AMENORRHEA, SECONDARY: Primary | ICD-10-CM

## 2025-05-15 DIAGNOSIS — E66.813 CLASS 3 SEVERE OBESITY DUE TO EXCESS CALORIES WITH BODY MASS INDEX (BMI) OF 50.0 TO 59.9 IN ADULT, UNSPECIFIED WHETHER SERIOUS COMORBIDITY PRESENT: ICD-10-CM

## 2025-05-15 PROCEDURE — 99215 OFFICE O/P EST HI 40 MIN: CPT | Performed by: STUDENT IN AN ORGANIZED HEALTH CARE EDUCATION/TRAINING PROGRAM

## 2025-05-15 ASSESSMENT — PAIN SCALES - GENERAL: PAINLEVEL_OUTOF10: 0-NO PAIN

## 2025-05-15 NOTE — LETTER
May 15, 2025     Patient: Nick Whiteside   YOB: 2005   Date of Visit: 5/15/2025       To Whom It May Concern:    Nick Whiteside was seen in my clinic on 5/15/2025 at 10:30 am. Please excuse Nick for her absence from work on this day to make the appointment.    If you have any questions or concerns, please don't hesitate to call.         Sincerely,         Yvette Benavidez MD        CC: No Recipients

## 2025-05-15 NOTE — PROGRESS NOTES
Assessment/Plan   Nick Whiteside is a 20 y.o. female with elevated BMI (60kg/m2) c/b secondary amenorrhea (previous elevated total testosterone with normal free T 12/13/22, on COCPs) and epilepsy (on Keppra) who is referred by her PCP, Dr. Mary Dodd MD  and presents for weight management      We discussed the goal of maintaining long-term health and that lifestyle is the foundation of all weight management. We also reviewed reasons that most people have difficulty with weight management through lifestyle modification alone. We discussed advanced therapies for weight management including medications with focus on GLP/GIP RA (although due to discussed access/insurance issues, these were difficult to get currently) and Qsymia (topiramate/phentermine) combination due to cost/effectiveness.     Discussed concerns with initiation of topiramate given seizure history. Will reach out to Dr. Coleman.    Patient would like to attempt to get tirzepatide. Discussed that it is likely not covered. If PA is denied, she would like to try     Summary of Effectiveness to date:   Meds: none, phentermine previously prescribed but not filled    1. Amenorrhea, secondary (Primary)  2. Acanthosis nigricans  3. Vitamin D deficiency  4. Class 3 severe obesity due to excess calories with body mass index (BMI) of 50.0 to 59.9 in adult, unspecified whether serious comorbidity present  Labs  - Comprehensive Metabolic Panel; Future  - Hemoglobin A1C; Future  - Lipid Panel; Future  - Vitamin D 25-Hydroxy,Total (for eval of Vitamin D levels); Future    Meds: will attempt to get Zepbound covered. If not will message patient to start phentermine which was previously prescribed  - tirzepatide, weight loss, (Zepbound) 2.5 mg/0.5 mL injection; Inject 2.5 mg under the skin every 7 days.  Dispense: 4 each; Refill: 1    Goals: you're going to keep track on your phone  - Nutrition: Decrease ginger ale to one can a day 3 days a week. The rest of the days,  you can keep drinking what you are now.   - Activity: Start lifting weights with your brother, 3 days a week for 15 minutes  - Sleep: Try turning the TV off at 9pm 3 nights a week and use  a sound machine instead    Handouts  - Sleep hygiene    5. Seizure (Multi)  - Message sent to Dr. Coleman re initiation of topiramate    Future Appointments   Date Time Provider Department Center   6/19/2025  3:00 PM Yvette Benavidez MD [id number] [facility]   7/14/2025 10:00 AM Jessee Corrigan MD [id number]               Subjective   Patient ID: Nick Whiteside is a 20 y.o. female who presents for Consult       HPI    Weight Journey History:     She wants to lose weight. She has seizures. It's hard because she's on all these medicines.   She has been trying to switch her foods. She has been baking fish, eating more rice, eggs.     She never started the phentermine that Dr. Bean prescribed.    Last seizure 2015    Nutrition    B[9 or 10am]: Doesn't eat breakfast but is trying to; granola bars or oatmeal, strawberries, grapes, pineapple  L [2pm]: salad (lettuce, eggs, cheese, onion, croutons, ranch  D [8pm]: fish (baked), more salad, rice, bread  Snacks: reports that she hasn't been eating snacks regularly  Drinks: pop (gingerale, 3 mini cans), water (10 glasses)    Eats at job- works at [employer] (apples, chicken patties, chicken strips)-- eats this during shift    Denies skipping meals/restriction  Denies bingeing  Denies emotional eating    Activity  She's supposed to get another bike.   Plans to get back working out. Brother helps her lift weights    Sleep  To bed at 9pm.   Wakes up at 6am  Wakes up in the middle of the night. Last night, 2am watches TV. Stays up until she has to go for the day.Happens 2x per week  Doesn't know if she snores  No restless sleep  No AM headache  Does feel rested in the morning    Screens  Outside of work, on screens 6-7hours. Movie, TVs, Social media, reads books  TV is on when going to  "sleep    Mood  Good. Denies feeling down, depressed         Emotional eating:  Body image: \"I have confidence, but it's also like I'm ready for something different\"  Restriction:Denies  Bingeing:Denies  Compensatory behavior: Denies    Periods  Regular on COCPs. Started taking pills in January. Had regular periods 1x per month.    Motivation:         HEADS Exam  Home: Mom, brother (23)  Education/Employment: Works at [employer]. Wants to go back to [employer]-- said she had to take a test first. Wants to be a travel nurse for kids    Activities: Laughing and talking with friends    Drugs: The patient denies use of alcohol, tobacco, or illicit drugs.      Sexuality/Gender: She/her, female   The patient has never had sex of any kind  Interested in [employer]  Suicide/Depression: Denies SI or self harm.      Review of Systems    Objective   Vitals:    05/15/25 1020   BP: 111/73   Pulse: 98   Resp: 22   Temp: 36.1 °C (97 °F)       Physical Exam  Vitals reviewed.   Constitutional:       Appearance: Normal appearance.   Neck:      Comments: +Acanthosis nigricans  Cardiovascular:      Rate and Rhythm: Normal rate and regular rhythm.      Heart sounds: Normal heart sounds.   Pulmonary:      Effort: Pulmonary effort is normal.      Breath sounds: Normal breath sounds.   Abdominal:      Palpations: Abdomen is soft.      Tenderness: There is no abdominal tenderness.   Musculoskeletal:         General: No swelling. Normal range of motion.      Cervical back: Normal range of motion. No rigidity or tenderness.   Lymphadenopathy:      Cervical: No cervical adenopathy.   Skin:     General: Skin is warm and dry.   Neurological:      General: No focal deficit present.      Mental Status: She is alert.           No results found for this or any previous visit (from the past 24 hours).    I spent 65 minutes in the professional and overall care of this patient on 05/15/2025 including Preparing to see the patient, Obtaining and/or reviewing " separately obtained history, Performing a medically necessary and appropriate examination and/or evaluation , Counseling and educating the patient/family/caregiver, Ordering medications, tests or procedures, Referring and communicating with other health care professionals (when not reported separately) , Documenting clinical information in the electronic or other health record , and Independently interpreting results (not reported separately) and communicating results to the patient/family/caregiver            Yvette Benavidez MD

## 2025-05-15 NOTE — PATIENT INSTRUCTIONS
It was great to see you today, Storm!    Goals: you're going to keep track on your phone  - Nutrition: Decrease ginger ale to one can a day 3 days a week. The rest of the days, you can keep drinking what you are now.   - Activity: Start lifting weights with your brother, 3 days a week for 15 minutes  - Sleep: Try turning the TV off at 9pm 3 nights a week and use  a sound machine instead    Sleep Tips  Sleep Hygiene:  Goal: To establish good sleep habits which will allow one to initiate and maintain sleep easier  Remain active and expose oneself to bright light during day  Quiet activities prior to sleep to allow one to unwind.  This would include reading, with the light behind you and not shining directly into your face, and listening to soft music or relaxation tapes.  Regular exercise in late afternoon or early evening promotes sleep but avoid strenuous activity just prior to bed  Avoid screens and bright lights at least 1 hour prior to bedtime including phone, television, computers and video games.  Avoid caffeine  Do your sleep routine around the same time every night to get a goal of 8-10 hours of sleep    Stimulus control   Goal: Re-establish the bedroom and bed as the place where one sleeps  1) Lie down when sleeping  2) Use bedroom for sleep only  3) Leave the bedroom  if you are still awake after 15 minutes  4) Perform non-stimulating activities (reading, listening to soft music) and return to bed when drowsy    Relaxation Techniques:   1) Mindfulness:    Here is a link to a Mindfulness website.  [email or web address]   Review the intro, and begin by trying a couple of the 5 minute exercises.   Explore some of the other exercises- see if it is right for you!  2) Meditation   a) Breathe in for 10 seconds and then out for 10 seconds    b) The mind may drift which is ok.  If it shifts to thoughts which are disturbing, refocus on breathing  3) Progressive Muscle Relaxation   a)  Contract and relax  sequential groups of muscles from your toes to your head.   4) Guided Imagery   a) Create a pleasant scenario which you can imagine as you are going off to sleep.      An example for someone who likes hot air balloons:       Picture yourself walking in a green field where the basket for your hot air balloon rests.  You smell the strong fumes of the burning fuel which is heating the air as the balloon begins to fill.  The  balloon has now rising upwards and only the ropes keep the balloon from flying away.  You are now in the balloon basket. As you release the ropes, you sail off into sleep.      Adapted from recommendations by Olegario Ballard MD Director of [facility] and Diplomat of the American Board of Sleep Medicine.    Topiramate and Phentermine   I will message your neurologist about the topiramate.    What are these medicines used for?    Topiramate helps patients feel less hungry and feel full more quickly. It may also help patients decrease binge eating behaviors.     Phentermine is thought to work in the brain to decrease appetite.      Both medications are used in people who carry extra weight AND who are enrolled in a weight loss program that includes dietary, physical activity, and behavior changes.       How do they work?    Topiramate was first developed to treat seizures in children and migraine headaches in adults. It affects chemical messengers in the brain, but the exact way it works to change appetite is unknown.   Phentermine is thought to work in the brain to decrease appetite.      Topiramate and phentermine may help you:    Feel less interest in eating in between meals    Think less about food and eating    Feel full or satisfied sooner    Have an easier time eating less      Topiramate extended release in combination with phentermine has been FDA approved for weight loss in patients 12 and older (marketed as Qsymia)    For some patients, these medications work  right away. They feel and think quite differently about food. Other patients don't feel much of a change but find that they lose weight. Finally, some patients do not have these feelings and do not have improvements in weight. For these patients, medications may be stopped after 3 months.       Like all weight loss medications, these medications work best when you help it work. This means:    Drinking water instead of sugar sweetened drinks (e.g. regular soda, juice)   Removing tempting high calorie (“junk”) food from the house    Staying away from situations or people that trigger your food cravings    Eating your meals at a table with all screens off (TV, phone)   Keeping track of what you are eating and drinking     How should I take these medications?    Topiramate   Week 1: One tablet (25 mg) once a day   Week 2: Two tablets (50 mg) once a day   Week 3: Three tablets (75 mg) once a day    Week 4: Four tablets (100mg) once a day. Stay at four tablets (100 mg) until you are seen again.      NEVER stop topiramate suddenly. Your medical provider will tell you how to slowly come off this medicine if needed.  NEVER take topiramate with alcohol     Phentermine   Phentermine is usually taken as a single daily dose in the morning with or without food.    Do not take a larger dose, take it more often, or take it for a longer period than your doctor tells you to. Do not drink alcohol while on phentermine.      Are these medications safe?    Topiramate   Although topiramate alone is not approved by the FDA for weight loss, it is used commonly in weight management clinics because of its effects on appetite.  It is also approved for children as young as 2 years old for other reasons such as seizures and migraines.     Most people tolerate topiramate with no problems. Please tell your medical provider if you have a history of kidney stones, if you are taking phenytoin (brand name: Dilantin) or birth control pills, or if you  are pregnant. Topiramate is harmful in pregnancy. Topiramate can decrease your ability to tolerate hot weather. You should be sure to drink plenty of water to prevent dehydration and kidney stones. Your medical provider will also check for possible interactions between your usual medications and topiramate.      One of the dangers of topiramate is the possibility of birth defects. If you get pregnant when you are taking topiramate, there is the risk that your baby will be born with a cleft lip or palate. If you are on topiramate and are of child bearing age, you must be on a reliable form of birth control or refrain from sex. Birth control pills may not work as effectively while taking topiramate.     Phentermine   Phentermine is not FDA approved for use in children or adolescents under 16 years of age by itself. You should not take phentermine if you have high blood pressure, heart disease, hyperthyroidism (overactive thyroid gland), glaucoma, if you are taking stimulant ADHD medications, if you have struggled with drug abuse, or if you are pregnant. Your medical provider will also check for possible interactions between your usual medications and phentermine.     What are the side effects?    Call your doctor right away if you notice any of the starred side effects:      Topiramate   Change in mood, especially depression or thoughts of suicide*     Severe pain in your sides, back, or groin (which may indicate a kidney stone)*   Sudden change in vision or eye pain*   New severe rash*     Phentermine   Chest pain*   Shortness of breath*    Difficulty doing exercises that you had been able to do before*    Swelling of the legs or ankles*    Severe restlessness, anxiety, or dizziness*    Increased blood pressure or heart rate       If you notice these less serious side effects, talk with your medical provider:     Topiramate   Mental fogginess, trouble concentrating, memory problems   Numbness or tingling in your  hands or feet   Fatigue   New overheating   Nausea, vomiting, diarrhea or constipation     Phentermine   Trouble sleeping    Diarrhea or constipation    Dry mouth      How much does it cost?    Topiramate: $5 per month   Phentermine: $20-30/month. Currently, phentermine is not covered by insurance      If the cost is significantly more than this when you  either medication, please call us.      (Developed by: [facility] & The [facility] at [facility]- [email or web address])     Tirzepatide (GLP 1RA/GIP Medication)     What are these medicines used for?    This medication was first developed to treat diabetes but have also been shown to have a significant effect of weight loss.           How do they work?    They work by affecting hormones in your body that leads to decreased appetite, decreased food intake, and decreased rate that the stomach empties into the small intestine.       These medications may help you:   Reduce food intake   Feel rios with food intake   Increase your feeling of control around eating habits        Like all weight loss medications, these medications work best in combination with healthy nutrition, physical activity, and sleep efforts.      How should I take these medications?      This medication is given by a small injection under the skin once a week   Weeks 1-4: 2.5mg once weekly   Weeks 5-8: 5mg once weekly   Weeks 9-12: 7.5mg once weekly   Weeks 13-16: 10mg once weekly   Weeks 17-20: 12.5 mg once weekly   Weeks 21-24: 15mg once weekly      What if I miss a dose?   If you miss a dose, take the missed dose as soon as possible within 4 days (96 hours) after the missed dose.   If more than 4 days have passed, skip the missed dose and take your next dose on the regularly scheduled day.?   Do not?take?2?doses within?3?days of each other.     Storage   Store in the refrigerator between 36?F to  46?F (2?C to 8?C). Store in the original carton until use to protect it from light.   If needed, each single-dose pen can be stored at room temperature up to 86?F (30?C) for up to 21 days.   Do not freeze and do not use if frozen     Are these medications safe?    This class of medication is approved for adults who have diabetes. As with any medication, there may be side effects. More serious things that can happen include allergic reactions, thyroid tumors, pancreatitis, gallbladder problems, kidney problems, and worsened depression.       If you are taking other by mouth medications, discuss with your doctor because tirzepatide can change the way your other medications are absorbed by your body.      If you are taking birth control pills, this medication may decrease the effectiveness of your medication. You should use a different method of birth control or add a barrier method (ex.condoms) for 4 weeks after starting this medication and for 4 weeks after each dose increase     You should not take these medications if you think you may be pregnant or are planning to become pregnant. You should not take these medications if you or a family member has medullary thyroid carcinoma or if you have multiple endocrine neoplasia type 2.       What are the possible side effects?    If you notice these common, but less serious side effects, talk with your medical provider:     Abdominal pain, nausea, vomiting, heartburn, diarrhea, constipation   Reaction at the injection site   Low blood sugar (only if taking this medication with certain diabetes medications)   Increase heart rate   Injection site reaction     Call your doctor right away if you notice any of the following less common side effects:    Lump or swelling in your neck, hoarseness, trouble swallowing or shortness of breath (could be related to a thyroid problem)   Severe abdominal pain, especially if it travels to the back (possible signs of pancreatitis)    Abdominal pain (especially in your upper stomach) with fever, yellowing of the skin or eyes or abel-colored stools (possible gallbladder problem)   Rash, facial swelling, and/or trouble breathing (allergic reaction)     How much does it cost?    The out of pocket cost does vary by insurance, but if it is >$50 per month, please call us before filling the prescription. You can visit this website to see if you qualify for a medication savings card.     (Developed by: [facility]’s [facility])

## 2025-05-16 LAB
25(OH)D3+25(OH)D2 SERPL-MCNC: 12 NG/ML (ref 30–100)
ALBUMIN SERPL-MCNC: 3.8 G/DL (ref 3.6–5.1)
ALP SERPL-CCNC: 84 U/L (ref 31–125)
ALT SERPL-CCNC: 8 U/L (ref 6–29)
ANION GAP SERPL CALCULATED.4IONS-SCNC: 6 MMOL/L (CALC) (ref 7–17)
AST SERPL-CCNC: 12 U/L (ref 10–30)
BILIRUB SERPL-MCNC: 0.3 MG/DL (ref 0.2–1.2)
BUN SERPL-MCNC: 9 MG/DL (ref 7–25)
CALCIUM SERPL-MCNC: 8.4 MG/DL (ref 8.6–10.2)
CHLORIDE SERPL-SCNC: 104 MMOL/L (ref 98–110)
CHOLEST SERPL-MCNC: 148 MG/DL
CHOLEST/HDLC SERPL: 2.8 (CALC)
CO2 SERPL-SCNC: 28 MMOL/L (ref 20–32)
CREAT SERPL-MCNC: 0.53 MG/DL (ref 0.5–0.96)
EGFRCR SERPLBLD CKD-EPI 2021: 134 ML/MIN/1.73M2
EST. AVERAGE GLUCOSE BLD GHB EST-MCNC: 111 MG/DL
EST. AVERAGE GLUCOSE BLD GHB EST-SCNC: 6.2 MMOL/L
GLUCOSE SERPL-MCNC: 79 MG/DL (ref 65–99)
HBA1C MFR BLD: 5.5 %
HDLC SERPL-MCNC: 52 MG/DL
LDLC SERPL CALC-MCNC: 84 MG/DL (CALC)
NONHDLC SERPL-MCNC: 96 MG/DL (CALC)
POTASSIUM SERPL-SCNC: 4.2 MMOL/L (ref 3.5–5.3)
PROT SERPL-MCNC: 7.4 G/DL (ref 6.1–8.1)
SODIUM SERPL-SCNC: 138 MMOL/L (ref 135–146)
TRIGL SERPL-MCNC: 50 MG/DL

## 2025-05-20 DIAGNOSIS — E55.9 VITAMIN D DEFICIENCY: ICD-10-CM

## 2025-05-20 RX ORDER — CHOLECALCIFEROL (VITAMIN D3) 25 MCG
100 TABLET ORAL DAILY
Qty: 120 TABLET | Refills: 11 | Status: SHIPPED | OUTPATIENT
Start: 2025-05-20 | End: 2026-05-20

## 2025-05-30 DIAGNOSIS — G40.309 GENERALIZED EPILEPSY (MULTI): ICD-10-CM

## 2025-06-03 RX ORDER — LEVETIRACETAM 500 MG/1
1000 TABLET, EXTENDED RELEASE ORAL DAILY
Qty: 60 TABLET | Refills: 2 | Status: SHIPPED | OUTPATIENT
Start: 2025-06-03 | End: 2025-09-01

## 2025-06-10 DIAGNOSIS — R56.9 SEIZURE (MULTI): ICD-10-CM

## 2025-06-10 RX ORDER — FOLIC ACID 0.4 MG
0.4 TABLET ORAL DAILY
Qty: 30 TABLET | Refills: 0 | Status: SHIPPED | OUTPATIENT
Start: 2025-06-10 | End: 2025-07-10

## 2025-06-19 ENCOUNTER — APPOINTMENT (OUTPATIENT)
Dept: PEDIATRICS | Facility: CLINIC | Age: 20
End: 2025-06-19
Payer: COMMERCIAL

## 2025-07-01 DIAGNOSIS — R56.9 SEIZURE (MULTI): ICD-10-CM

## 2025-07-01 RX ORDER — FOLIC ACID 0.4 MG
0.4 TABLET ORAL DAILY
Qty: 30 TABLET | Refills: 5 | Status: SHIPPED | OUTPATIENT
Start: 2025-07-01 | End: 2025-12-28

## 2025-07-14 ENCOUNTER — OFFICE VISIT (OUTPATIENT)
Dept: NEUROLOGY | Facility: HOSPITAL | Age: 20
End: 2025-07-14
Payer: COMMERCIAL

## 2025-07-14 VITALS
DIASTOLIC BLOOD PRESSURE: 78 MMHG | BODY MASS INDEX: 47.09 KG/M2 | WEIGHT: 293 LBS | HEART RATE: 53 BPM | SYSTOLIC BLOOD PRESSURE: 128 MMHG | HEIGHT: 66 IN

## 2025-07-14 DIAGNOSIS — R56.9 SEIZURE (MULTI): ICD-10-CM

## 2025-07-14 DIAGNOSIS — G40.309 GENERALIZED EPILEPSY (MULTI): Primary | ICD-10-CM

## 2025-07-14 PROCEDURE — 99214 OFFICE O/P EST MOD 30 MIN: CPT | Mod: GC | Performed by: STUDENT IN AN ORGANIZED HEALTH CARE EDUCATION/TRAINING PROGRAM

## 2025-07-14 PROCEDURE — 99204 OFFICE O/P NEW MOD 45 MIN: CPT | Performed by: STUDENT IN AN ORGANIZED HEALTH CARE EDUCATION/TRAINING PROGRAM

## 2025-07-14 PROCEDURE — 3008F BODY MASS INDEX DOCD: CPT | Performed by: STUDENT IN AN ORGANIZED HEALTH CARE EDUCATION/TRAINING PROGRAM

## 2025-07-14 RX ORDER — LEVETIRACETAM 500 MG/1
1000 TABLET, EXTENDED RELEASE ORAL DAILY
Qty: 180 TABLET | Refills: 3 | Status: SHIPPED | OUTPATIENT
Start: 2025-07-14

## 2025-07-14 RX ORDER — DIAZEPAM 10 MG/100UL
1 SPRAY NASAL ONCE
Qty: 1 SPRAY | Refills: 1 | Status: SHIPPED | OUTPATIENT
Start: 2025-07-14 | End: 2025-07-14

## 2025-07-14 RX ORDER — FOLIC ACID 0.4 MG
0.4 TABLET ORAL DAILY
Qty: 90 TABLET | Refills: 3 | Status: SHIPPED | OUTPATIENT
Start: 2025-07-14

## 2025-07-14 ASSESSMENT — ENCOUNTER SYMPTOMS: SEIZURES: 0

## 2025-07-14 NOTE — PROGRESS NOTES
"Adult Epilepsy Clinic History and Physical    History Of Present Illness  Nick Whiteside is a 20 y.o. right-handed female presenting to the epilepsy clinic as transition of care from pediatric to adult epilepsy clinic. She was previously a patient of Dr. Clemente and Dr. Coleman.     Anamnesis (per patient): Patient reports she had her first seizure at the age of 13-14. She felt \"Something is off\", she went to her mom and next thing she remembers is seeing EMS personals trying to talk to her. She felt tired. She vomited multiple times. It took about 1-2 days for her to feel her self normal. She was told she had a GTC seizure.    Anamnesis (per witness): -NA-   Patient presented alone to this visit. Per chart review, mother reported starring episodes although patent does not recall those.     She was attempted to be taken off Keppra in 2021 but had recurrence with GTC seizure. vEEG at this time show generalized spike/polyspike and wave complexes in sleep. She was placed back on Keppra in 05/2021 and has been seizure free since. She notes sometimes she forgets to take her medications. No current side effects. She takes 400 mcg folic acid daily. Her workplace requests refill on Valtoco.    Epilepsy Risk Factors:   History of head trauma: No  History of Febrile seizures: No  Family history of seizures/epilepsy: Maternal uncle  History of CNS infection: No  History of other CNS injury: No  Birth/Developmental history: Unremarkable    Epilepsy Co-morbidites:  Depression: No  Anxiety: No  Memory loss: No  Sleep disorders: No  Migraines: No    History of status epilepticus: no    Social History:   ETOH: no  Smoking: no  Illicit drug use: Marijuana smoking  Occupation: Fast food mixing drinking, cash  Education level: no  Driving: no    Past Medical History  Medical History[1]  Surgical History  Surgical History[2]  Social History  Social History[3]  Allergies  Banana and Watermelon  Prescriptions Prior to " Admission[4]  Medications  Current Outpatient Medications   Medication Instructions    albuterol 90 mcg/actuation inhaler 2 puffs, inhalation, Every 4 hours PRN    diazePAM (Valtoco) 20 mg/2 spray spray,non-aerosol nasal spray 1 spray, Each Nostril, Once, For Seizure longer than 3 min.    Dulera 100-5 mcg/actuation inhaler 2 puffs, inhalation, 2 times daily, RINSE MOUTH WITH WATER AFTER USE TO REDUCE AFTERTASTE AND INCIDENCE OF CANDIDIASIS DO NOT SWALLOW    fluticasone (Flonase) 50 mcg/actuation nasal spray 1 spray, Each Nostril, Daily    folic acid (FOLVITE) 0.4 mg, oral, Daily    levETIRAcetam XR (KEPPRA XR) 1,000 mg, oral, Daily    mometasone-formoterol (Dulera) 100-5 mcg/actuation inhaler 2 puffs, inhalation, 2 times daily, RINSE MOUTH WITH WATER AFTER USE TO REDUCE AFTERTASTE AND INCIDENCE OF CANDIDIASIS DO NOT SWALLOW    multivitamin tablet 1 tablet, oral, Daily    nasal spray midazolam (Nayzilam) 5 mg/spray (0.1 mL) spray,non-aerosol Administer into affected nostril(s).    norgestimate-ethinyl estradiol (Ortho-Cyclen) 0.25-35 mg-mcg tablet 1 tablet, oral, Daily    phentermine 8 mg, oral, Daily    tirzepatide (weight loss) (ZEPBOUND) 2.5 mg, subcutaneous, Every 7 days    Vitamin D3 100 mcg, oral, Daily           Review of Systems   Neurological:  Negative for seizures.   All other systems reviewed and are negative.    GENERAL APPEARANCE:  No distress, alert and cooperative.   MENTAL STATE:  Orientation was normal to time, place and person. Recent and remote memory was intact.  Attention span and concentration were normal. Language testing was normal for comprehension, repetition, expression, and naming. General fund of knowledge was intact.   CRANIAL NERVES:     CN 2- Visual Acuity was grossly normal. Visual fields full to confrontation by finger counting.      CN 3, 4, 6-  Pupils round, 3 mm in diameter. No ptosis. EOMs normal alignment, full range of movement, no nystagmus     CN 5- Facial sensation intact  "bilaterally.      CN 7- Normal and symmetric facial strength. Nasolabial folds symmetric.     CN 8- Hearing intact      CN 9- Palate elevates symmetrically.      CN 11- Normal strength of shoulder shrug and neck turning      CN 12- Tongue midline, with normal bulk and strength; no fasciculations.   MOTOR:  Motor exam was normal. Muscle bulk and tone were normal in both upper and lower extremities. Muscle strength was 5/5 in distal and proximal muscles in both upper and lower extremities. No fasciculations, tremor or other abnormal movements were present.   REFLEXES:  Right/Left:  Biceps 2/2, brachioradialis 2/2, patellar 2/2, achilles 2/2   SENSORY: Sensory exam was intact to light touch in both UE and LE.   COORDINATION: Finger tapping and open-close hand maneuvers were smooth and without interruptions.  Finger-nose-finger was intact without dysmetria or overshoot.    GAIT: Station was stable with a normal base and negative Romberg sign. Gait was stable with a normal arm swing and speed. No ataxia, shuffling, steppage or waddling was noted. Tandem gait was intact  Last Recorded Vitals  Blood pressure 128/78, pulse 53, height 1.676 m (5' 6\"), weight (!) 168 kg (370 lb), not currently breastfeeding.    Relevant Results/Neurodiagnostics  No MRI head results found for the past 12 months    No EEG results found for the past 12 months     Routine EE/2016: Normal  2017: Normal  Video EEG:   2020: Normal  2019: video EEG for 24 hours. This video EEG is abnormal based on the presence of generalized polyspikes, seen 1-2 times per 4 hours in early stages of sleep. This finding is suggest of potential generalized epileptogenicity. No seizures recorded. No staring spell captured.   2021: The vEEG was normal. There were no seizures, epileptiform discharges or lateralizing signs seen.       Neuroimaging: MRI 2017: Unremarkable Examination     I have personally reviewed the above imaging and EEG " results.     Assessment/Plan  4D-Classification:  Event type: Epileptic paroxysmal event  Seizure Semiology: Dialeptic? -> Left version -> GTC seizure  Frequency: None sicne 5/2/2021  Epileptogenic zone: Generalized  Etiology: Genetic/Idiopathic   Co-morbidities: None    Triggers: Unknown  Onset of events: 13 yo, 2017  Current ASMs: leveteracetam     Previous ASMs: None    Nick Whiteside is a 20 y.o. female with history of generalized epilepsy (likely generalized epilepsy with generalized tonic-clonic seizures alone vs CHANTE) currently maintained on Keppra 750 mg BID and seizure free since 05/20201 who presents to [facility] care with adult epilepsy care.     Patient remains seizure free on Keppra 750 dose. No side effects from medication. She is not driving at the moment but is planning on getting her license in the future.    Plan:  Refill levetiracetam 1000 mg daily (2x500 mg ER)  Refill folic acid 0.4 mg daily  Refill Valtoco (Diazepam) 20 mg spray for as needed use for seizure lasting for more than 3 mins or seizure cluster.   No seizure restrictions     Follow up in 1 year.     Jessee Corrigan MD           [1]   Past Medical History:  Diagnosis Date    Acanthosis nigricans 09/25/2023    Asthma, mild persistent ([facility]) 09/25/2023    Generalized epilepsy (Multi) 09/25/2023    Obesity 09/25/2023    Suspected sleep apnea 09/25/2023   [2]   Past Surgical History:  Procedure Laterality Date    TONSILLECTOMY  05/19/2017    Tonsillectomy With Adenoidectomy   [3]   Social History  Tobacco Use    Smoking status: Never    Smokeless tobacco: Never   Vaping Use    Vaping status: Never Used   Substance Use Topics    Alcohol use: Never    Drug use: Never   [4] (Not in a hospital admission)

## 2025-07-29 DIAGNOSIS — G40.309 GENERALIZED EPILEPSY (MULTI): ICD-10-CM

## 2025-07-29 RX ORDER — LEVETIRACETAM 500 MG/1
1000 TABLET, EXTENDED RELEASE ORAL DAILY
Qty: 60 TABLET | Refills: 2 | Status: SHIPPED | OUTPATIENT
Start: 2025-07-29

## 2025-07-29 NOTE — TELEPHONE ENCOUNTER
Unable to reach by phone, left VM to please reach out to Dr Corrigan's office for refill of LEV when needed. Pt has active script for LEV with 3 refills.